# Patient Record
Sex: FEMALE | Race: WHITE | Employment: OTHER | URBAN - METROPOLITAN AREA
[De-identification: names, ages, dates, MRNs, and addresses within clinical notes are randomized per-mention and may not be internally consistent; named-entity substitution may affect disease eponyms.]

---

## 2017-01-16 RX ORDER — ZOLPIDEM TARTRATE 10 MG/1
TABLET ORAL
Qty: 90 TABLET | Refills: 0 | Status: SHIPPED | OUTPATIENT
Start: 2017-01-16 | End: 2017-04-24 | Stop reason: SDUPTHER

## 2017-02-09 ENCOUNTER — OFFICE VISIT (OUTPATIENT)
Dept: INTERNAL MEDICINE CLINIC | Age: 72
End: 2017-02-09

## 2017-02-09 VITALS
SYSTOLIC BLOOD PRESSURE: 130 MMHG | WEIGHT: 130.4 LBS | DIASTOLIC BLOOD PRESSURE: 100 MMHG | HEIGHT: 64 IN | BODY MASS INDEX: 22.26 KG/M2 | HEART RATE: 56 BPM | RESPIRATION RATE: 16 BRPM

## 2017-02-09 DIAGNOSIS — I10 ESSENTIAL HYPERTENSION: Primary | ICD-10-CM

## 2017-02-09 PROCEDURE — 4040F PNEUMOC VAC/ADMIN/RCVD: CPT | Performed by: INTERNAL MEDICINE

## 2017-02-09 PROCEDURE — 1123F ACP DISCUSS/DSCN MKR DOCD: CPT | Performed by: INTERNAL MEDICINE

## 2017-02-09 PROCEDURE — G8399 PT W/DXA RESULTS DOCUMENT: HCPCS | Performed by: INTERNAL MEDICINE

## 2017-02-09 PROCEDURE — 1090F PRES/ABSN URINE INCON ASSESS: CPT | Performed by: INTERNAL MEDICINE

## 2017-02-09 PROCEDURE — 1036F TOBACCO NON-USER: CPT | Performed by: INTERNAL MEDICINE

## 2017-02-09 PROCEDURE — 3017F COLORECTAL CA SCREEN DOC REV: CPT | Performed by: INTERNAL MEDICINE

## 2017-02-09 PROCEDURE — G8419 CALC BMI OUT NRM PARAM NOF/U: HCPCS | Performed by: INTERNAL MEDICINE

## 2017-02-09 PROCEDURE — G8484 FLU IMMUNIZE NO ADMIN: HCPCS | Performed by: INTERNAL MEDICINE

## 2017-02-09 PROCEDURE — 3014F SCREEN MAMMO DOC REV: CPT | Performed by: INTERNAL MEDICINE

## 2017-02-09 PROCEDURE — 99213 OFFICE O/P EST LOW 20 MIN: CPT | Performed by: INTERNAL MEDICINE

## 2017-02-09 PROCEDURE — G8427 DOCREV CUR MEDS BY ELIG CLIN: HCPCS | Performed by: INTERNAL MEDICINE

## 2017-02-09 RX ORDER — TRIAMTERENE AND HYDROCHLOROTHIAZIDE 37.5; 25 MG/1; MG/1
1 TABLET ORAL DAILY
Qty: 30 TABLET | Refills: 3 | Status: SHIPPED | OUTPATIENT
Start: 2017-02-09 | End: 2017-03-09 | Stop reason: SDUPTHER

## 2017-03-09 ENCOUNTER — OFFICE VISIT (OUTPATIENT)
Dept: INTERNAL MEDICINE CLINIC | Age: 72
End: 2017-03-09

## 2017-03-09 VITALS
WEIGHT: 128.8 LBS | SYSTOLIC BLOOD PRESSURE: 120 MMHG | DIASTOLIC BLOOD PRESSURE: 60 MMHG | HEIGHT: 64 IN | BODY MASS INDEX: 21.99 KG/M2 | HEART RATE: 64 BPM | RESPIRATION RATE: 16 BRPM

## 2017-03-09 DIAGNOSIS — I10 ESSENTIAL HYPERTENSION: Primary | ICD-10-CM

## 2017-03-09 LAB
ANION GAP SERPL CALCULATED.3IONS-SCNC: 18 MMOL/L (ref 3–16)
BUN BLDV-MCNC: 14 MG/DL (ref 7–20)
CALCIUM SERPL-MCNC: 9.8 MG/DL (ref 8.3–10.6)
CHLORIDE BLD-SCNC: 99 MMOL/L (ref 99–110)
CO2: 24 MMOL/L (ref 21–32)
CREAT SERPL-MCNC: 0.7 MG/DL (ref 0.6–1.2)
GFR AFRICAN AMERICAN: >60
GFR NON-AFRICAN AMERICAN: >60
GLUCOSE BLD-MCNC: 88 MG/DL (ref 70–99)
POTASSIUM SERPL-SCNC: 4.1 MMOL/L (ref 3.5–5.1)
SODIUM BLD-SCNC: 141 MMOL/L (ref 136–145)

## 2017-03-09 PROCEDURE — 3017F COLORECTAL CA SCREEN DOC REV: CPT | Performed by: INTERNAL MEDICINE

## 2017-03-09 PROCEDURE — G8427 DOCREV CUR MEDS BY ELIG CLIN: HCPCS | Performed by: INTERNAL MEDICINE

## 2017-03-09 PROCEDURE — 3014F SCREEN MAMMO DOC REV: CPT | Performed by: INTERNAL MEDICINE

## 2017-03-09 PROCEDURE — 4040F PNEUMOC VAC/ADMIN/RCVD: CPT | Performed by: INTERNAL MEDICINE

## 2017-03-09 PROCEDURE — 1123F ACP DISCUSS/DSCN MKR DOCD: CPT | Performed by: INTERNAL MEDICINE

## 2017-03-09 PROCEDURE — G8419 CALC BMI OUT NRM PARAM NOF/U: HCPCS | Performed by: INTERNAL MEDICINE

## 2017-03-09 PROCEDURE — G8484 FLU IMMUNIZE NO ADMIN: HCPCS | Performed by: INTERNAL MEDICINE

## 2017-03-09 PROCEDURE — G8399 PT W/DXA RESULTS DOCUMENT: HCPCS | Performed by: INTERNAL MEDICINE

## 2017-03-09 PROCEDURE — 1036F TOBACCO NON-USER: CPT | Performed by: INTERNAL MEDICINE

## 2017-03-09 PROCEDURE — 1090F PRES/ABSN URINE INCON ASSESS: CPT | Performed by: INTERNAL MEDICINE

## 2017-03-09 PROCEDURE — 99213 OFFICE O/P EST LOW 20 MIN: CPT | Performed by: INTERNAL MEDICINE

## 2017-03-09 RX ORDER — TRIAMTERENE AND HYDROCHLOROTHIAZIDE 37.5; 25 MG/1; MG/1
1 TABLET ORAL DAILY
Qty: 90 TABLET | Refills: 3 | Status: SHIPPED | OUTPATIENT
Start: 2017-03-09 | End: 2018-02-15 | Stop reason: SDUPTHER

## 2017-04-24 RX ORDER — ZOLPIDEM TARTRATE 10 MG/1
TABLET ORAL
Qty: 90 TABLET | Refills: 0 | Status: SHIPPED | OUTPATIENT
Start: 2017-04-24 | End: 2017-08-14 | Stop reason: SDUPTHER

## 2017-05-05 ENCOUNTER — OFFICE VISIT (OUTPATIENT)
Dept: INTERNAL MEDICINE CLINIC | Age: 72
End: 2017-05-05

## 2017-05-05 VITALS
HEIGHT: 64 IN | TEMPERATURE: 97.9 F | BODY MASS INDEX: 22.09 KG/M2 | HEART RATE: 75 BPM | OXYGEN SATURATION: 97 % | DIASTOLIC BLOOD PRESSURE: 80 MMHG | WEIGHT: 129.4 LBS | SYSTOLIC BLOOD PRESSURE: 118 MMHG

## 2017-05-05 DIAGNOSIS — J40 BRONCHITIS: Primary | ICD-10-CM

## 2017-05-05 DIAGNOSIS — I10 ESSENTIAL HYPERTENSION: ICD-10-CM

## 2017-05-05 PROCEDURE — 1036F TOBACCO NON-USER: CPT | Performed by: INTERNAL MEDICINE

## 2017-05-05 PROCEDURE — G8427 DOCREV CUR MEDS BY ELIG CLIN: HCPCS | Performed by: INTERNAL MEDICINE

## 2017-05-05 PROCEDURE — 3014F SCREEN MAMMO DOC REV: CPT | Performed by: INTERNAL MEDICINE

## 2017-05-05 PROCEDURE — G8399 PT W/DXA RESULTS DOCUMENT: HCPCS | Performed by: INTERNAL MEDICINE

## 2017-05-05 PROCEDURE — 1090F PRES/ABSN URINE INCON ASSESS: CPT | Performed by: INTERNAL MEDICINE

## 2017-05-05 PROCEDURE — 99213 OFFICE O/P EST LOW 20 MIN: CPT | Performed by: INTERNAL MEDICINE

## 2017-05-05 PROCEDURE — 4040F PNEUMOC VAC/ADMIN/RCVD: CPT | Performed by: INTERNAL MEDICINE

## 2017-05-05 PROCEDURE — 1123F ACP DISCUSS/DSCN MKR DOCD: CPT | Performed by: INTERNAL MEDICINE

## 2017-05-05 PROCEDURE — 3017F COLORECTAL CA SCREEN DOC REV: CPT | Performed by: INTERNAL MEDICINE

## 2017-05-05 PROCEDURE — G8419 CALC BMI OUT NRM PARAM NOF/U: HCPCS | Performed by: INTERNAL MEDICINE

## 2017-05-05 RX ORDER — CEFUROXIME AXETIL 250 MG/1
250 TABLET ORAL 2 TIMES DAILY
Qty: 20 TABLET | Refills: 0 | Status: SHIPPED | OUTPATIENT
Start: 2017-05-05 | End: 2017-05-15

## 2017-05-05 ASSESSMENT — ENCOUNTER SYMPTOMS
EYES NEGATIVE: 1
CHEST TIGHTNESS: 0
SHORTNESS OF BREATH: 0
CHOKING: 0
COUGH: 1

## 2017-05-19 ENCOUNTER — TELEPHONE (OUTPATIENT)
Dept: INTERNAL MEDICINE CLINIC | Age: 72
End: 2017-05-19

## 2017-05-19 RX ORDER — AZITHROMYCIN 250 MG/1
TABLET, FILM COATED ORAL
Qty: 1 PACKET | Refills: 0 | Status: SHIPPED | OUTPATIENT
Start: 2017-05-19 | End: 2017-05-29

## 2017-08-14 RX ORDER — ZOLPIDEM TARTRATE 10 MG/1
TABLET ORAL
Qty: 90 TABLET | Refills: 0 | Status: SHIPPED | OUTPATIENT
Start: 2017-08-14 | End: 2017-12-28 | Stop reason: SDUPTHER

## 2017-09-13 ENCOUNTER — TELEPHONE (OUTPATIENT)
Dept: INTERNAL MEDICINE CLINIC | Age: 72
End: 2017-09-13

## 2017-09-13 DIAGNOSIS — E78.00 PURE HYPERCHOLESTEROLEMIA: Primary | ICD-10-CM

## 2017-09-13 DIAGNOSIS — I10 ESSENTIAL HYPERTENSION: ICD-10-CM

## 2017-09-18 LAB
A/G RATIO: 2 (ref 1.1–2.2)
ALBUMIN SERPL-MCNC: 4.2 G/DL (ref 3.4–5)
ALP BLD-CCNC: 58 U/L (ref 40–129)
ALT SERPL-CCNC: 26 U/L (ref 10–40)
ANION GAP SERPL CALCULATED.3IONS-SCNC: 13 MMOL/L (ref 3–16)
AST SERPL-CCNC: 31 U/L (ref 15–37)
BILIRUB SERPL-MCNC: <0.2 MG/DL (ref 0–1)
BUN BLDV-MCNC: 23 MG/DL (ref 7–20)
CALCIUM SERPL-MCNC: 9.8 MG/DL (ref 8.3–10.6)
CHLORIDE BLD-SCNC: 103 MMOL/L (ref 99–110)
CHOLESTEROL, TOTAL: 183 MG/DL (ref 0–199)
CO2: 27 MMOL/L (ref 21–32)
CREAT SERPL-MCNC: 0.8 MG/DL (ref 0.6–1.2)
GFR AFRICAN AMERICAN: >60
GFR NON-AFRICAN AMERICAN: >60
GLOBULIN: 2.1 G/DL
GLUCOSE BLD-MCNC: 82 MG/DL (ref 70–99)
HDLC SERPL-MCNC: 62 MG/DL (ref 40–60)
LDL CHOLESTEROL CALCULATED: 110 MG/DL
POTASSIUM SERPL-SCNC: 4.5 MMOL/L (ref 3.5–5.1)
SODIUM BLD-SCNC: 143 MMOL/L (ref 136–145)
TOTAL PROTEIN: 6.3 G/DL (ref 6.4–8.2)
TRIGL SERPL-MCNC: 53 MG/DL (ref 0–150)
TSH SERPL DL<=0.05 MIU/L-ACNC: 3.27 UIU/ML (ref 0.27–4.2)
VLDLC SERPL CALC-MCNC: 11 MG/DL

## 2017-09-25 ENCOUNTER — OFFICE VISIT (OUTPATIENT)
Dept: INTERNAL MEDICINE CLINIC | Age: 72
End: 2017-09-25

## 2017-09-25 VITALS
RESPIRATION RATE: 16 BRPM | SYSTOLIC BLOOD PRESSURE: 104 MMHG | WEIGHT: 128.2 LBS | HEIGHT: 64 IN | DIASTOLIC BLOOD PRESSURE: 64 MMHG | BODY MASS INDEX: 21.89 KG/M2 | HEART RATE: 78 BPM

## 2017-09-25 DIAGNOSIS — F51.01 PRIMARY INSOMNIA: ICD-10-CM

## 2017-09-25 DIAGNOSIS — I10 ESSENTIAL HYPERTENSION: Primary | ICD-10-CM

## 2017-09-25 DIAGNOSIS — E78.00 PURE HYPERCHOLESTEROLEMIA: ICD-10-CM

## 2017-09-25 DIAGNOSIS — Z23 NEED FOR INFLUENZA VACCINATION: ICD-10-CM

## 2017-09-25 PROCEDURE — 3014F SCREEN MAMMO DOC REV: CPT | Performed by: INTERNAL MEDICINE

## 2017-09-25 PROCEDURE — G8399 PT W/DXA RESULTS DOCUMENT: HCPCS | Performed by: INTERNAL MEDICINE

## 2017-09-25 PROCEDURE — G8427 DOCREV CUR MEDS BY ELIG CLIN: HCPCS | Performed by: INTERNAL MEDICINE

## 2017-09-25 PROCEDURE — 4040F PNEUMOC VAC/ADMIN/RCVD: CPT | Performed by: INTERNAL MEDICINE

## 2017-09-25 PROCEDURE — 1036F TOBACCO NON-USER: CPT | Performed by: INTERNAL MEDICINE

## 2017-09-25 PROCEDURE — 90662 IIV NO PRSV INCREASED AG IM: CPT | Performed by: INTERNAL MEDICINE

## 2017-09-25 PROCEDURE — 99214 OFFICE O/P EST MOD 30 MIN: CPT | Performed by: INTERNAL MEDICINE

## 2017-09-25 PROCEDURE — 1090F PRES/ABSN URINE INCON ASSESS: CPT | Performed by: INTERNAL MEDICINE

## 2017-09-25 PROCEDURE — 3017F COLORECTAL CA SCREEN DOC REV: CPT | Performed by: INTERNAL MEDICINE

## 2017-09-25 PROCEDURE — 1123F ACP DISCUSS/DSCN MKR DOCD: CPT | Performed by: INTERNAL MEDICINE

## 2017-09-25 PROCEDURE — G8420 CALC BMI NORM PARAMETERS: HCPCS | Performed by: INTERNAL MEDICINE

## 2017-09-25 PROCEDURE — G0008 ADMIN INFLUENZA VIRUS VAC: HCPCS | Performed by: INTERNAL MEDICINE

## 2017-09-25 ASSESSMENT — PATIENT HEALTH QUESTIONNAIRE - PHQ9
SUM OF ALL RESPONSES TO PHQ9 QUESTIONS 1 & 2: 0
SUM OF ALL RESPONSES TO PHQ QUESTIONS 1-9: 0
2. FEELING DOWN, DEPRESSED OR HOPELESS: 0
1. LITTLE INTEREST OR PLEASURE IN DOING THINGS: 0

## 2017-11-13 RX ORDER — NORTRIPTYLINE HYDROCHLORIDE 10 MG/1
CAPSULE ORAL
Qty: 270 CAPSULE | Refills: 3 | Status: SHIPPED | OUTPATIENT
Start: 2017-11-13 | End: 2018-03-26 | Stop reason: SDUPTHER

## 2017-11-13 NOTE — TELEPHONE ENCOUNTER
From: Crystal Hogan  Sent: 11/12/2017 10:56 PM EST  Subject: Medication Renewal Request    Crystal Hogan would like a refill of the following medications:  nortriptyline (PAMELOR) 10 MG capsule [GUDREEP Macdonald MD]    [facility], [address] [place] [address] RD - P [phone] - F [phone]    Comment:  My refills of Nortriptyline will run out in a few days. Previous directions said to take 1-3 pills. However, Dr. Cade Him suggesting taking 4 pills to help with drowsiness at night to avoid going higher on my Ambien. It's been working well with this regimen.

## 2017-12-29 RX ORDER — ZOLPIDEM TARTRATE 10 MG/1
10 TABLET ORAL NIGHTLY PRN
Qty: 90 TABLET | Refills: 0 | Status: SHIPPED | OUTPATIENT
Start: 2017-12-29 | End: 2018-05-15 | Stop reason: SDUPTHER

## 2018-02-12 ENCOUNTER — OFFICE VISIT (OUTPATIENT)
Dept: INTERNAL MEDICINE CLINIC | Age: 73
End: 2018-02-12

## 2018-02-12 VITALS
HEART RATE: 82 BPM | SYSTOLIC BLOOD PRESSURE: 110 MMHG | HEIGHT: 64 IN | RESPIRATION RATE: 16 BRPM | BODY MASS INDEX: 22.67 KG/M2 | DIASTOLIC BLOOD PRESSURE: 60 MMHG | WEIGHT: 132.8 LBS

## 2018-02-12 DIAGNOSIS — I73.00 RAYNAUD'S DISEASE WITHOUT GANGRENE: Primary | ICD-10-CM

## 2018-02-12 PROCEDURE — 1090F PRES/ABSN URINE INCON ASSESS: CPT | Performed by: INTERNAL MEDICINE

## 2018-02-12 PROCEDURE — 4040F PNEUMOC VAC/ADMIN/RCVD: CPT | Performed by: INTERNAL MEDICINE

## 2018-02-12 PROCEDURE — 1036F TOBACCO NON-USER: CPT | Performed by: INTERNAL MEDICINE

## 2018-02-12 PROCEDURE — G8420 CALC BMI NORM PARAMETERS: HCPCS | Performed by: INTERNAL MEDICINE

## 2018-02-12 PROCEDURE — 3014F SCREEN MAMMO DOC REV: CPT | Performed by: INTERNAL MEDICINE

## 2018-02-12 PROCEDURE — 1123F ACP DISCUSS/DSCN MKR DOCD: CPT | Performed by: INTERNAL MEDICINE

## 2018-02-12 PROCEDURE — G8484 FLU IMMUNIZE NO ADMIN: HCPCS | Performed by: INTERNAL MEDICINE

## 2018-02-12 PROCEDURE — G8399 PT W/DXA RESULTS DOCUMENT: HCPCS | Performed by: INTERNAL MEDICINE

## 2018-02-12 PROCEDURE — G8427 DOCREV CUR MEDS BY ELIG CLIN: HCPCS | Performed by: INTERNAL MEDICINE

## 2018-02-12 PROCEDURE — 99212 OFFICE O/P EST SF 10 MIN: CPT | Performed by: INTERNAL MEDICINE

## 2018-02-12 PROCEDURE — 3017F COLORECTAL CA SCREEN DOC REV: CPT | Performed by: INTERNAL MEDICINE

## 2018-02-12 NOTE — PROGRESS NOTES
Subjective:      Patient ID: Rochelle Briggs is a 81 y.o. female. HPI  Here for evaluation of bilateral hands with white fingertips that have been occurring in the last 6 weeks. No other joint problems or swallowing problems. Has had some numbness of her fingertips at times as well Which is intermittent and is not present every day. The raynaud's is familial in her family. No family history of autoimmune disease. Review of Systems    Objective:   Physical Exam  Both hands are normal. There is some nonblanching purplish discoloration of the left thumb on the palmar side. Bilateral hands with normal radial pulses and good capillary refill. There are no ulcerations of the phalanges. Assessment:      Raynaud's phenomenon-  No other history or sign of autoimmune disease. Doubt this is of any concern. Discussed keeping fingers warm and cold weather. She will call if the symptoms progress. Doubt the hands numbness represents any neuropathy.       Plan:      Follow-up when necessary

## 2018-02-15 ENCOUNTER — TELEPHONE (OUTPATIENT)
Dept: INTERNAL MEDICINE CLINIC | Age: 73
End: 2018-02-15

## 2018-02-16 ENCOUNTER — TELEPHONE (OUTPATIENT)
Dept: INTERNAL MEDICINE CLINIC | Age: 73
End: 2018-02-16

## 2018-02-16 DIAGNOSIS — J40 BRONCHITIS: Primary | ICD-10-CM

## 2018-02-16 RX ORDER — GUAIFENESIN AND CODEINE PHOSPHATE 100; 10 MG/5ML; MG/5ML
5-10 SOLUTION ORAL EVERY 4 HOURS PRN
Qty: 180 ML | Refills: 1 | Status: SHIPPED | OUTPATIENT
Start: 2018-02-16 | End: 2018-03-26

## 2018-02-16 RX ORDER — AZITHROMYCIN 250 MG/1
TABLET, FILM COATED ORAL
Qty: 1 PACKET | Refills: 0 | Status: SHIPPED | OUTPATIENT
Start: 2018-02-16 | End: 2018-02-26

## 2018-02-16 NOTE — TELEPHONE ENCOUNTER
Patient was instructed to call today if symptoms were not improved with Mucinex DM. Patient is reporting that she's exp lots more coughing and mucous with one dose of Mucinex DM. Patient was unable to sleep last evening due to coughing. Please advise.

## 2018-03-26 ENCOUNTER — OFFICE VISIT (OUTPATIENT)
Dept: INTERNAL MEDICINE CLINIC | Age: 73
End: 2018-03-26

## 2018-03-26 VITALS
SYSTOLIC BLOOD PRESSURE: 116 MMHG | HEIGHT: 64 IN | RESPIRATION RATE: 16 BRPM | HEART RATE: 72 BPM | WEIGHT: 129.6 LBS | DIASTOLIC BLOOD PRESSURE: 72 MMHG | BODY MASS INDEX: 22.13 KG/M2

## 2018-03-26 DIAGNOSIS — F51.01 PRIMARY INSOMNIA: ICD-10-CM

## 2018-03-26 DIAGNOSIS — I10 ESSENTIAL HYPERTENSION: Primary | ICD-10-CM

## 2018-03-26 DIAGNOSIS — E78.00 PURE HYPERCHOLESTEROLEMIA: ICD-10-CM

## 2018-03-26 LAB
A/G RATIO: 2 (ref 1.1–2.2)
ALBUMIN SERPL-MCNC: 4.5 G/DL (ref 3.4–5)
ALP BLD-CCNC: 59 U/L (ref 40–129)
ALT SERPL-CCNC: 16 U/L (ref 10–40)
ANION GAP SERPL CALCULATED.3IONS-SCNC: 14 MMOL/L (ref 3–16)
AST SERPL-CCNC: 19 U/L (ref 15–37)
BILIRUB SERPL-MCNC: 0.5 MG/DL (ref 0–1)
BUN BLDV-MCNC: 20 MG/DL (ref 7–20)
CALCIUM SERPL-MCNC: 9.5 MG/DL (ref 8.3–10.6)
CHLORIDE BLD-SCNC: 101 MMOL/L (ref 99–110)
CHOLESTEROL, TOTAL: 189 MG/DL (ref 0–199)
CO2: 25 MMOL/L (ref 21–32)
CREAT SERPL-MCNC: 0.7 MG/DL (ref 0.6–1.2)
GFR AFRICAN AMERICAN: >60
GFR NON-AFRICAN AMERICAN: >60
GLOBULIN: 2.2 G/DL
GLUCOSE BLD-MCNC: 99 MG/DL (ref 70–99)
HDLC SERPL-MCNC: 74 MG/DL (ref 40–60)
LDL CHOLESTEROL CALCULATED: 99 MG/DL
POTASSIUM SERPL-SCNC: 4.1 MMOL/L (ref 3.5–5.1)
SODIUM BLD-SCNC: 140 MMOL/L (ref 136–145)
TOTAL PROTEIN: 6.7 G/DL (ref 6.4–8.2)
TRIGL SERPL-MCNC: 80 MG/DL (ref 0–150)
VLDLC SERPL CALC-MCNC: 16 MG/DL

## 2018-03-26 PROCEDURE — 1036F TOBACCO NON-USER: CPT | Performed by: INTERNAL MEDICINE

## 2018-03-26 PROCEDURE — 3014F SCREEN MAMMO DOC REV: CPT | Performed by: INTERNAL MEDICINE

## 2018-03-26 PROCEDURE — G8420 CALC BMI NORM PARAMETERS: HCPCS | Performed by: INTERNAL MEDICINE

## 2018-03-26 PROCEDURE — 1123F ACP DISCUSS/DSCN MKR DOCD: CPT | Performed by: INTERNAL MEDICINE

## 2018-03-26 PROCEDURE — G8427 DOCREV CUR MEDS BY ELIG CLIN: HCPCS | Performed by: INTERNAL MEDICINE

## 2018-03-26 PROCEDURE — 4040F PNEUMOC VAC/ADMIN/RCVD: CPT | Performed by: INTERNAL MEDICINE

## 2018-03-26 PROCEDURE — 99214 OFFICE O/P EST MOD 30 MIN: CPT | Performed by: INTERNAL MEDICINE

## 2018-03-26 PROCEDURE — 1090F PRES/ABSN URINE INCON ASSESS: CPT | Performed by: INTERNAL MEDICINE

## 2018-03-26 PROCEDURE — G8482 FLU IMMUNIZE ORDER/ADMIN: HCPCS | Performed by: INTERNAL MEDICINE

## 2018-03-26 PROCEDURE — G8399 PT W/DXA RESULTS DOCUMENT: HCPCS | Performed by: INTERNAL MEDICINE

## 2018-03-26 PROCEDURE — 3017F COLORECTAL CA SCREEN DOC REV: CPT | Performed by: INTERNAL MEDICINE

## 2018-03-26 RX ORDER — NORTRIPTYLINE HYDROCHLORIDE 10 MG/1
CAPSULE ORAL
Qty: 450 CAPSULE | Refills: 3 | Status: SHIPPED | OUTPATIENT
Start: 2018-03-26 | End: 2018-05-14

## 2018-03-26 NOTE — PROGRESS NOTES
Subjective:      Patient ID: Ike Baird is a 81 y.o. female. HPI   Here for f/u of her htn and hyperlipidemia and her insomia. She is bothered with a dry mouth,is not sure what is causing this trouble. 1. Essential hypertension  -Denies chest pain or shortness of breath. Denies PND or orthopnea. No lower extremity edema noted. 2. Pure hypercholesterolemia  -Tolerating medications well. Denies myalgias or muscle weakness. 3. Primary insomnia  -still not sleeping well and has dry mouth with the nortriptyline      Review of Systems    Current Outpatient Prescriptions on File Prior to Visit   Medication Sig Dispense Refill    triamterene-hydrochlorothiazide (MAXZIDE-25) 37.5-25 MG per tablet TAKE 1 TABLET BY MOUTH DAILY 90 tablet 1    zolpidem (AMBIEN) 10 MG tablet Take 1 tablet by mouth nightly as needed for Sleep for up to 90 days. 90 tablet 0    nortriptyline (PAMELOR) 10 MG capsule TAKE ONE TO THREE CAPSULES BY MOUTH EVERY NIGHT AT BEDTIME AS DIRECTED 270 capsule 3    simvastatin (ZOCOR) 40 MG tablet TAKE 1 TABLET BY MOUTH EVERY EVENING 90 tablet 3    Cyanocobalamin (B-12) 100 MCG TABS Take by mouth      omeprazole (PRILOSEC OTC) 20 MG tablet Take 20 mg by mouth daily      Multiple Vitamins-Minerals (MULTIPLE VITAMINS/WOMENS PO) Take by mouth      Fish Oil-Cholecalciferol (FISH OIL + D3 PO) Take by mouth      Cholecalciferol (VITAMIN D3) 2000 UNITS CAPS Take by mouth      ESTRACE VAGINAL 0.1 MG/GM vaginal cream Vaginal cream twice weekly      aspirin 81 MG tablet Take 81 mg by mouth daily. No current facility-administered medications on file prior to visit. Objective:   Physical Exam   Constitutional: She appears well-developed and well-nourished. Cardiovascular: Normal rate, regular rhythm and normal heart sounds. Pulmonary/Chest: Effort normal and breath sounds normal. She has no wheezes. She has no rales. Musculoskeletal: She exhibits no edema.    Vitals

## 2018-03-28 DIAGNOSIS — E78.00 PURE HYPERCHOLESTEROLEMIA: Primary | ICD-10-CM

## 2018-03-28 RX ORDER — ATORVASTATIN CALCIUM 40 MG/1
40 TABLET, FILM COATED ORAL DAILY
Qty: 90 TABLET | Refills: 1 | Status: SHIPPED | OUTPATIENT
Start: 2018-03-28 | End: 2018-09-16 | Stop reason: SDUPTHER

## 2018-05-15 RX ORDER — ZOLPIDEM TARTRATE 10 MG/1
TABLET ORAL
Qty: 90 TABLET | Refills: 0 | Status: SHIPPED | OUTPATIENT
Start: 2018-05-15 | End: 2018-11-04 | Stop reason: SDUPTHER

## 2018-07-09 ENCOUNTER — OFFICE VISIT (OUTPATIENT)
Dept: SLEEP MEDICINE | Age: 73
End: 2018-07-09

## 2018-07-09 VITALS
HEART RATE: 59 BPM | WEIGHT: 132.6 LBS | HEIGHT: 64 IN | RESPIRATION RATE: 18 BRPM | SYSTOLIC BLOOD PRESSURE: 84 MMHG | DIASTOLIC BLOOD PRESSURE: 60 MMHG | BODY MASS INDEX: 22.64 KG/M2 | TEMPERATURE: 98.1 F | OXYGEN SATURATION: 98 %

## 2018-07-09 DIAGNOSIS — G25.81 RESTLESS LEG SYNDROME: ICD-10-CM

## 2018-07-09 DIAGNOSIS — F51.04 CHRONIC INSOMNIA: ICD-10-CM

## 2018-07-09 DIAGNOSIS — G25.81 RESTLESS LEG SYNDROME: Primary | ICD-10-CM

## 2018-07-09 DIAGNOSIS — E61.1 LOW IRON: ICD-10-CM

## 2018-07-09 LAB — FERRITIN: 62.2 NG/ML (ref 15–150)

## 2018-07-09 PROCEDURE — G8420 CALC BMI NORM PARAMETERS: HCPCS | Performed by: PSYCHIATRY & NEUROLOGY

## 2018-07-09 PROCEDURE — 4040F PNEUMOC VAC/ADMIN/RCVD: CPT | Performed by: PSYCHIATRY & NEUROLOGY

## 2018-07-09 PROCEDURE — G8399 PT W/DXA RESULTS DOCUMENT: HCPCS | Performed by: PSYCHIATRY & NEUROLOGY

## 2018-07-09 PROCEDURE — 99204 OFFICE O/P NEW MOD 45 MIN: CPT | Performed by: PSYCHIATRY & NEUROLOGY

## 2018-07-09 PROCEDURE — G8427 DOCREV CUR MEDS BY ELIG CLIN: HCPCS | Performed by: PSYCHIATRY & NEUROLOGY

## 2018-07-09 PROCEDURE — 1090F PRES/ABSN URINE INCON ASSESS: CPT | Performed by: PSYCHIATRY & NEUROLOGY

## 2018-07-09 PROCEDURE — 3017F COLORECTAL CA SCREEN DOC REV: CPT | Performed by: PSYCHIATRY & NEUROLOGY

## 2018-07-09 PROCEDURE — 1123F ACP DISCUSS/DSCN MKR DOCD: CPT | Performed by: PSYCHIATRY & NEUROLOGY

## 2018-07-09 PROCEDURE — 1036F TOBACCO NON-USER: CPT | Performed by: PSYCHIATRY & NEUROLOGY

## 2018-07-09 RX ORDER — ROPINIROLE 0.5 MG/1
TABLET, FILM COATED ORAL
Qty: 60 TABLET | Refills: 5 | Status: SHIPPED | OUTPATIENT
Start: 2018-07-09 | End: 2018-07-09 | Stop reason: SDUPTHER

## 2018-07-09 RX ORDER — ROPINIROLE 0.5 MG/1
TABLET, FILM COATED ORAL
Qty: 180 TABLET | Refills: 5 | Status: SHIPPED | OUTPATIENT
Start: 2018-07-09 | End: 2018-10-17

## 2018-07-09 ASSESSMENT — SLEEP AND FATIGUE QUESTIONNAIRES
NECK CIRCUMFERENCE (INCHES): 13.75
HOW LIKELY ARE YOU TO NOD OFF OR FALL ASLEEP WHILE SITTING AND TALKING TO SOMEONE: 0
HOW LIKELY ARE YOU TO NOD OFF OR FALL ASLEEP WHILE SITTING AND READING: 1
ESS TOTAL SCORE: 3
HOW LIKELY ARE YOU TO NOD OFF OR FALL ASLEEP WHILE LYING DOWN TO REST IN THE AFTERNOON WHEN CIRCUMSTANCES PERMIT: 1
HOW LIKELY ARE YOU TO NOD OFF OR FALL ASLEEP WHEN YOU ARE A PASSENGER IN A CAR FOR AN HOUR WITHOUT A BREAK: 1
HOW LIKELY ARE YOU TO NOD OFF OR FALL ASLEEP WHILE SITTING QUIETLY AFTER LUNCH WITHOUT ALCOHOL: 0
HOW LIKELY ARE YOU TO NOD OFF OR FALL ASLEEP WHILE SITTING INACTIVE IN A PUBLIC PLACE: 0
HOW LIKELY ARE YOU TO NOD OFF OR FALL ASLEEP WHILE WATCHING TV: 0
HOW LIKELY ARE YOU TO NOD OFF OR FALL ASLEEP IN A CAR, WHILE STOPPED FOR A FEW MINUTES IN TRAFFIC: 0

## 2018-07-09 ASSESSMENT — ENCOUNTER SYMPTOMS
EYES NEGATIVE: 1
ALLERGIC/IMMUNOLOGIC NEGATIVE: 1
RESPIRATORY NEGATIVE: 1
CHOKING: 0
APNEA: 0

## 2018-07-09 NOTE — PROGRESS NOTES
MD ELISHA Simental Board Certified in Sleep Medicine  Certified in [address] [place] [address] Certified in Neurology [address]  [address]  [facility] [address],  [place] [address]  326 [facility] ([address] Sleep   [address] [facility], [address] [place] [address] SLEEP MEDICINE [place]    Subjective:     Patient ID: Lennox Prose is a 72 y.o. female. Chief Complaint   Patient presents with    Other     ref Dr Rico Wang, insomnia, no studie       HPI:        Lennox Prose is a 72 y.o. female referred by Dr Razia Evans for a sleep evaluation. She complains of sleep initiation and maintening insomnia but she denies snoring, snorting, choking, periods of not breathing, knees buckling with laughing, completely or partially paralyzed while falling asleep or waking up, sinus problems, congested nose, feels sleepy during the day, take naps during the day, noisy environment, uncomfortable room temperature, uncomfortable bedding. Symptoms began 40 years ago, gradually worsening since that time. SLEEP SCHEDULE: Goes to bed around 11 PM in the weekdays and 11 PM in the weekends. It usually takes the patient 30 minutes with Ambien 5 mg to fall asleep. The patient gets up 2-3 per night to go to the bathroom. The Patient finally gets up at 8 AM during the weekdays and 8 AM in the weekends. patient wakes up with dry mouth. The patient has restless sleep with frequent arousals. The Patient scored Total score: 3 on Melba Sleepiness Scale ( more than 10 is indicative of daytime sleepiness) and 15 in fatigue scale ( more than 36 is indicative of daytime fatigue). The patient takes no naps. On Ambien for several year, recently added very small dose of Unisom and helped a lot. Previous evaluation and treatment has included- none.     Insomnia with sleep initiation and maintaining, usually takes the patient 2 hours to fall in sleep, wakes up 2-3 times from few minutes each time to fall back in sleep, usually stays in bed trying to fall in sleep, this might happened 7 days/week. Restless leg syndrome symptoms; patient has overwhelming urge to move the legs itchy feet or jumpy legs, has to rub the bottoms of the feet or take a shower, usually associated with unpleasant sensations. The urge to move the legs is worse at rest and at night and relieved by movement. Patient has 2-3 episodes a week, sometime the patient has trouble falling asleep due to this feeling, mostly at the bed time associated with sleep disturbance and with involuntary, jerking movements of the legs during sleep. The patient notes were attached to hr record. DOT/CDL - No  FAA/'s license - No      Previous Report(s) Reviewed: historical medical records         Social History     Social History    Marital status:      Spouse name: N/A    Number of children: N/A    Years of education: N/A     Occupational History    Not on file. Social History Main Topics    Smoking status: Never Smoker    Smokeless tobacco: Never Used    Alcohol use No    Drug use: No    Sexual activity: Not on file     Other Topics Concern    Not on file     Social History Narrative    No narrative on file       Prior to Admission medications    Medication Sig Start Date End Date Taking?  Authorizing Provider   Doxylamine Succinate, Sleep, (UNISOM PO) Take by mouth   Yes Historical Provider, MD   rOPINIRole (REQUIP) 0.5 MG tablet 1-2 tabs 2 hours before the bedtime 7/9/18  Yes Paco Nguyen MD   zolpidem (AMBIEN) 10 MG tablet TAKE 1 TABLET BY MOUTH EVERY NIGHT AS NEEDED FOR SLEEP 5/15/18 8/13/18 Yes Neno Narvaez MD   atorvastatin (LIPITOR) 40 MG tablet Take 1 tablet by mouth daily 3/28/18  Yes Neno Narvaez MD   triamterene-hydrochlorothiazide [facility]) 37.5-25 MG per tablet TAKE 1 TABLET BY MOUTH DAILY 2/15/18  Yes Neno Narvaez MD for arthralgias and myalgias. Allergic/Immunologic: Negative. Neurological: Negative. Hematological: Bruises/bleeds easily. Psychiatric/Behavioral: Negative. Objective:     Vitals:  Weight BMI Neck circumference    Wt Readings from Last 3 Encounters:   07/09/18 132 lb 9.6 oz (60.1 kg)   03/26/18 129 lb 9.6 oz (58.8 kg)   02/12/18 132 lb 12.8 oz (60.2 kg)    Body mass index is 22.76 kg/m². Neck circumference: 13.75     BP HR SaO2   BP Readings from Last 3 Encounters:   07/09/18 84/60   03/26/18 116/72   02/12/18 110/60    Pulse Readings from Last 3 Encounters:   07/09/18 59   03/26/18 72   02/12/18 82    SpO2 Readings from Last 3 Encounters:   07/09/18 98%   05/05/17 97%        The mandibular molar Class :   [x]1 []2 []3      Mallampati I Airway Classification:   []1 []2 [x]3 []4        Physical Exam   Constitutional: No distress. HENT:   Mallampati class 3, no retrognathia or hypognathia , normal airflow in bilateral nostrils, no septum deviation , crowded oropharynx with low soft palate, high arched hard palate,no tonsils enlargement. Eyes: EOM are normal.   Neck: Normal range of motion. Neck supple. No tracheal deviation present. No thyromegaly present. Cardiovascular: Normal rate, normal heart sounds and intact distal pulses. Pulmonary/Chest: Effort normal and breath sounds normal. No respiratory distress. She has no wheezes. Musculoskeletal: Normal range of motion. She exhibits no edema or tenderness. Neurological: She is alert. She has normal reflexes. No cranial nerve deficit. Coordination normal.   Skin: Skin is warm. Psychiatric: She has a normal mood and affect. Nursing note and vitals reviewed. Assessment:      Diagnosis Orders   1. Restless leg syndrome  Ferritin    rOPINIRole (REQUIP) 0.5 MG tablet   2. Low iron  Ferritin   3. Chronic insomnia       Plan:      Will treat the RLS wit Requip, I will increase the dose as needed, I will consider adding gabapentin and

## 2018-09-25 ENCOUNTER — TELEPHONE (OUTPATIENT)
Dept: SLEEP MEDICINE | Age: 73
End: 2018-09-25

## 2018-10-17 ENCOUNTER — OFFICE VISIT (OUTPATIENT)
Dept: SLEEP MEDICINE | Age: 73
End: 2018-10-17
Payer: MEDICARE

## 2018-10-17 VITALS
RESPIRATION RATE: 18 BRPM | DIASTOLIC BLOOD PRESSURE: 78 MMHG | BODY MASS INDEX: 22.71 KG/M2 | WEIGHT: 133 LBS | HEART RATE: 52 BPM | OXYGEN SATURATION: 98 % | TEMPERATURE: 97.9 F | HEIGHT: 64 IN | SYSTOLIC BLOOD PRESSURE: 110 MMHG

## 2018-10-17 DIAGNOSIS — F51.04 CHRONIC INSOMNIA: ICD-10-CM

## 2018-10-17 DIAGNOSIS — G25.81 RLS (RESTLESS LEGS SYNDROME): Primary | ICD-10-CM

## 2018-10-17 PROCEDURE — G8399 PT W/DXA RESULTS DOCUMENT: HCPCS | Performed by: PSYCHIATRY & NEUROLOGY

## 2018-10-17 PROCEDURE — G8427 DOCREV CUR MEDS BY ELIG CLIN: HCPCS | Performed by: PSYCHIATRY & NEUROLOGY

## 2018-10-17 PROCEDURE — 1090F PRES/ABSN URINE INCON ASSESS: CPT | Performed by: PSYCHIATRY & NEUROLOGY

## 2018-10-17 PROCEDURE — 4040F PNEUMOC VAC/ADMIN/RCVD: CPT | Performed by: PSYCHIATRY & NEUROLOGY

## 2018-10-17 PROCEDURE — 99212 OFFICE O/P EST SF 10 MIN: CPT | Performed by: PSYCHIATRY & NEUROLOGY

## 2018-10-17 PROCEDURE — 1101F PT FALLS ASSESS-DOCD LE1/YR: CPT | Performed by: PSYCHIATRY & NEUROLOGY

## 2018-10-17 PROCEDURE — 1123F ACP DISCUSS/DSCN MKR DOCD: CPT | Performed by: PSYCHIATRY & NEUROLOGY

## 2018-10-17 PROCEDURE — G8482 FLU IMMUNIZE ORDER/ADMIN: HCPCS | Performed by: PSYCHIATRY & NEUROLOGY

## 2018-10-17 PROCEDURE — 1036F TOBACCO NON-USER: CPT | Performed by: PSYCHIATRY & NEUROLOGY

## 2018-10-17 PROCEDURE — G8420 CALC BMI NORM PARAMETERS: HCPCS | Performed by: PSYCHIATRY & NEUROLOGY

## 2018-10-17 PROCEDURE — 3017F COLORECTAL CA SCREEN DOC REV: CPT | Performed by: PSYCHIATRY & NEUROLOGY

## 2018-10-17 RX ORDER — ROPINIROLE 1 MG/1
TABLET, FILM COATED ORAL
Qty: 90 TABLET | Refills: 3 | Status: SHIPPED | OUTPATIENT
Start: 2018-10-17 | End: 2019-04-17

## 2018-10-17 ASSESSMENT — ENCOUNTER SYMPTOMS: APNEA: 0

## 2019-04-17 ENCOUNTER — OFFICE VISIT (OUTPATIENT)
Dept: SLEEP MEDICINE | Age: 74
End: 2019-04-17
Payer: MEDICARE

## 2019-04-17 ENCOUNTER — TELEPHONE (OUTPATIENT)
Dept: SLEEP MEDICINE | Age: 74
End: 2019-04-17

## 2019-04-17 VITALS
HEART RATE: 67 BPM | OXYGEN SATURATION: 93 % | SYSTOLIC BLOOD PRESSURE: 116 MMHG | RESPIRATION RATE: 16 BRPM | TEMPERATURE: 98.2 F | WEIGHT: 132.2 LBS | DIASTOLIC BLOOD PRESSURE: 78 MMHG | BODY MASS INDEX: 22.57 KG/M2 | HEIGHT: 64 IN

## 2019-04-17 DIAGNOSIS — G25.81 RLS (RESTLESS LEGS SYNDROME): ICD-10-CM

## 2019-04-17 DIAGNOSIS — G47.19 EXCESSIVE DAYTIME SLEEPINESS: ICD-10-CM

## 2019-04-17 DIAGNOSIS — F51.01 PRIMARY INSOMNIA: ICD-10-CM

## 2019-04-17 DIAGNOSIS — R06.89 GASPING FOR BREATH: Primary | ICD-10-CM

## 2019-04-17 PROCEDURE — 1036F TOBACCO NON-USER: CPT | Performed by: PSYCHIATRY & NEUROLOGY

## 2019-04-17 PROCEDURE — G8399 PT W/DXA RESULTS DOCUMENT: HCPCS | Performed by: PSYCHIATRY & NEUROLOGY

## 2019-04-17 PROCEDURE — G8420 CALC BMI NORM PARAMETERS: HCPCS | Performed by: PSYCHIATRY & NEUROLOGY

## 2019-04-17 PROCEDURE — 1123F ACP DISCUSS/DSCN MKR DOCD: CPT | Performed by: PSYCHIATRY & NEUROLOGY

## 2019-04-17 PROCEDURE — 1090F PRES/ABSN URINE INCON ASSESS: CPT | Performed by: PSYCHIATRY & NEUROLOGY

## 2019-04-17 PROCEDURE — 4040F PNEUMOC VAC/ADMIN/RCVD: CPT | Performed by: PSYCHIATRY & NEUROLOGY

## 2019-04-17 PROCEDURE — 3017F COLORECTAL CA SCREEN DOC REV: CPT | Performed by: PSYCHIATRY & NEUROLOGY

## 2019-04-17 PROCEDURE — 99214 OFFICE O/P EST MOD 30 MIN: CPT | Performed by: PSYCHIATRY & NEUROLOGY

## 2019-04-17 PROCEDURE — G8427 DOCREV CUR MEDS BY ELIG CLIN: HCPCS | Performed by: PSYCHIATRY & NEUROLOGY

## 2019-04-17 RX ORDER — ROPINIROLE 1 MG/1
TABLET, FILM COATED ORAL
Qty: 90 TABLET | Refills: 3 | Status: SHIPPED | OUTPATIENT
Start: 2019-04-17 | End: 2019-07-17 | Stop reason: SDUPTHER

## 2019-04-17 RX ORDER — GABAPENTIN 100 MG/1
CAPSULE ORAL
Qty: 60 CAPSULE | Refills: 5 | Status: SHIPPED | OUTPATIENT
Start: 2019-04-17 | End: 2019-07-17

## 2019-04-17 ASSESSMENT — ENCOUNTER SYMPTOMS
GASTROINTESTINAL NEGATIVE: 1
CHOKING: 1

## 2019-04-17 NOTE — PROGRESS NOTES
MD ELISHA Calixto Board Certified in Sleep Medicine  Certified in [address] [place] [address] Certified in Neurology Renata Amy [address]30 [address] [facility], KATIE Shay 69  [phone]   [address] [place] [address], [address] [place] [address]                      [address] [address] [phone]    Subjective:     Patient ID: Elinor Nielson is a 72 y.o. female. Chief Complaint   Patient presents with    Follow-up     meds       HPI:        Elinor Nielson is a 72 y.o. female was seen today as a follow for restless leg syndrome and insomnia. Started having symptoms at 12-1 AM and she took the Requip for it, also around 4-5 PM also would have RLS before she takes the nighttime dose of Requip. Feels herself in light sleep. She wakes up every 2 hours just to look at the clock. Takes only 1/4 Ambien of 5 mg. Had an episode of gasping for air last week during her sleep, she had similar episodes several years ago, has EDS but does not takes naps.        Previous Report(s)Reviewed: historical medical records         Social History     Socioeconomic History    Marital status:      Spouse name: Not on file    Number of children: Not on file    Years of education: Not on file    Highest education level: Not on file   Occupational History    Not on file   Social Needs    Financial resource strain: Not on file    Food insecurity:     Worry: Not on file     Inability: Not on file    Transportation needs:     Medical: Not on file     Non-medical: Not on file   Tobacco Use    Smoking status: Never Smoker    Smokeless tobacco: Never Used   Substance and Sexual Activity    Alcohol use: No    Drug use: No    Sexual activity: Not on file   Lifestyle    Physical activity:     Days per week: Not on file     Minutes per session: Not on file    Stress: Not on file   Relationships    Social connections:     Talks on phone: Not on file     Gets together: Not on file     Attends Cheondoism service: Not on file     Active member of club or organization: Not on file     Attends meetings of clubs or organizations: Not on file     Relationship status: Not on file    Intimate partner violence:     Fear of current or ex partner: Not on file     Emotionally abused: Not on file     Physically abused: Not on file     Forced sexual activity: Not on file   Other Topics Concern    Not on file   Social History Narrative    Not on file       Prior to Admission medications    Medication Sig Start Date End Date Taking? Authorizing Provider   rOPINIRole (REQUIP) 1 MG tablet One tab 2 hours before the bedtime, and one tab BID PRN 4/17/19  Yes Lavinia Gould MD   gabapentin (NEURONTIN) 100 MG capsule 1-2 caps 2 hours 4/17/19 4/17/22 Yes Lavinia Gould MD   Turmeric 450 MG CAPS Take by mouth   Yes Historical Provider, MD   atorvastatin (LIPITOR) 40 MG tablet TAKE 1 TABLET BY MOUTH DAILY 9/17/18  Yes Adri Frank MD   Doxylamine Succinate, Sleep, (UNISOM PO) Take by mouth   Yes Historical Provider, MD   Cyanocobalamin (B-12) 100 MCG TABS Take by mouth   Yes Historical Provider, MD   omeprazole (PRILOSEC OTC) 20 MG tablet Take 20 mg by mouth daily   Yes Historical Provider, MD   Multiple Vitamins-Minerals (MULTIPLE VITAMINS/WOMENS PO) Take by mouth   Yes Historical Provider, MD   Fish Oil-Cholecalciferol (FISH OIL + D3 PO) Take by mouth   Yes Historical Provider, MD   Cholecalciferol (VITAMIN D3) 2000 UNITS CAPS Take by mouth   Yes Historical Provider, MD   ESTRACE VAGINAL 0.1 MG/GM vaginal cream Vaginal cream twice weekly 2/7/12  Yes Historical Provider, MD   aspirin 81 MG tablet Take 81 mg by mouth daily.      Yes Historical Provider, MD       Allergies as of 04/17/2019 - Review Complete 04/17/2019   Allergen Reaction Noted    Nortriptyline  05/14/2018  Tramadol hcl  11/26/2012    Tramadol  06/09/2015       Patient Active Problem List   Diagnosis    Osteopenia    Insomnia    Hyperlipidemia    Anxiety    Essential hypertension       Past Medical History:   Diagnosis Date    Essential hypertension 2/9/2017    Gastric ulcer 8-2006    EGD    Hyperlipidemia        Past Surgical History:   Procedure Laterality Date    COLONOSCOPY  2002, 9/11/12    Haroon Rebel-  due in 2022   [address]  4/2012    Dr. Andrew Callejas (@ )   [facility] SURGERY  8-2008    Dr Ammy Egan  10/06    [facility] for [facility] Higginbotham)       Family History   Problem Relation Age of Onset    Heart Attack Mother         MI    Heart Attack Sister 47        MI (no tobacco use)    High Cholesterol Sister     Hypertension Sister     Heart Disease Sister 47        MI    Heart Attack Father 80        MI    Hypertension Brother     High Cholesterol Brother        Review of Systems   Constitutional: Positive for fatigue (EDS). Respiratory: Positive for choking (gasping for breath during sleep). Cardiovascular: Negative for leg swelling. Gastrointestinal: Negative. Genitourinary: Negative for frequency. Skin: Negative. Neurological: Negative. Psychiatric/Behavioral: Positive for sleep disturbance. Objective:     Vitals:  Weight BMI Neck circumference    Wt Readings from Last 3 Encounters:   04/17/19 132 lb 3.2 oz (60 kg)   02/04/19 131 lb 6.4 oz (59.6 kg)   01/18/19 135 lb 12.8 oz (61.6 kg)    Body mass index is 22.69 kg/m².        BP HR SaO2   BP Readings from Last 3 Encounters:   04/17/19 116/78   02/04/19 110/78   01/18/19 (!) 142/80    Pulse Readings from Last 3 Encounters:   04/17/19 67   02/04/19 60   01/18/19 62    SpO2 Readings from Last 3 Encounters:   04/17/19 93%   10/17/18 98%   07/09/18 98%          Themandibular molar Class :   [x]1 []2 []3      Mallampati I Airway Classification:   []1 []2 [x]3 []4      Physical Exam Constitutional: No distress. HENT:   Nose: Nose normal.   Eyes: EOM are normal.   Neck: Neck supple. Cardiovascular: Normal rate, regular rhythm and normal heart sounds. Pulmonary/Chest: Effort normal and breath sounds normal. No respiratory distress. Musculoskeletal: Normal range of motion. She exhibits no edema. Neurological: She is alert. Skin: Skin is warm. Psychiatric: She has a normal mood and affect. Nursing note and vitals reviewed. Assessment:        Diagnosis Orders   1. Gasping for breath  Baseline Diagnostic Sleep Study   2. RLS (restless legs syndrome)  rOPINIRole (REQUIP) 1 MG tablet    gabapentin (NEURONTIN) 100 MG capsule    Baseline Diagnostic Sleep Study   3. Primary insomnia  gabapentin (NEURONTIN) 100 MG capsule    Baseline Diagnostic Sleep Study   4. Excessive daytime sleepiness  Baseline Diagnostic Sleep Study     Plan: Will add Gabapentin for RLS along with Requip. D/c Dave. Will do PSG for suspected mild JAY, aslo for RSL. Orders Placed This Encounter   Procedures    Baseline Diagnostic Sleep Study       Return in about 3 months (around 7/17/2019) for insomnia, PSG.     Eleni Parrish MD  Medical Director - [facility]

## 2019-04-17 NOTE — PATIENT INSTRUCTIONS
worry when you lie down, start a worry book. Well before bedtime, write down your worries, and then set the book and your concerns aside. · Try meditation or other relaxation techniques before you go to bed. · If you cannot fall asleep, get up and go to another room until you feel sleepy. Do something relaxing. Repeat your bedtime routine before you go to bed again. · Make your house quiet and calm about an hour before bedtime. Turn down the lights, turn off the TV, log off the computer, and turn down the volume on music. This can help you relax after a busy day. When should you call for help? Watch closely for changes in your health, and be sure to contact your doctor if:    · Your efforts to improve your sleep do not work.     · Your insomnia gets worse.     · You have been feeling down, depressed, or hopeless or have lost interest in things that you usually enjoy. Where can you learn more? Go to https://[email or web address].[email or web address]. org and sign in to your [employer] account. Enter P513 in the Right Hemisphere box to learn more about \"Insomnia: Care Instructions. \"     If you do not have an account, please click on the \"Sign Up Now\" link. Current as of: June 28, 2018  Content Version: 11.9  © 4075-3819 [employer]. Care instructions adapted under license by [facility] ([facility]). If you have questions about a medical condition or this instruction, always ask your healthcare professional. [place] [address] any warranty or liability for your use of this information.

## 2019-05-24 ENCOUNTER — HOSPITAL ENCOUNTER (OUTPATIENT)
Dept: SLEEP CENTER | Age: 74
Discharge: HOME | End: 2019-05-24
Payer: MEDICARE

## 2019-05-24 DIAGNOSIS — G25.81 RLS (RESTLESS LEGS SYNDROME): ICD-10-CM

## 2019-05-24 DIAGNOSIS — R06.89 GASPING FOR BREATH: ICD-10-CM

## 2019-05-24 DIAGNOSIS — G47.19 EXCESSIVE DAYTIME SLEEPINESS: ICD-10-CM

## 2019-05-24 DIAGNOSIS — F51.01 PRIMARY INSOMNIA: ICD-10-CM

## 2019-05-24 PROCEDURE — 95810 POLYSOM 6/> YRS 4/> PARAM: CPT

## 2019-05-28 PROCEDURE — 95810 POLYSOM 6/> YRS 4/> PARAM: CPT | Performed by: PSYCHIATRY & NEUROLOGY

## 2019-06-03 ENCOUNTER — TELEPHONE (OUTPATIENT)
Dept: PULMONOLOGY | Age: 74
End: 2019-06-03

## 2019-06-03 NOTE — TELEPHONE ENCOUNTER
Phone call to patient with her PSG results. Her AHI was 0.0 and she did not desaturate. She expressed understanding.

## 2019-07-17 ENCOUNTER — OFFICE VISIT (OUTPATIENT)
Dept: SLEEP MEDICINE | Age: 74
End: 2019-07-17
Payer: MEDICARE

## 2019-07-17 VITALS
WEIGHT: 133.4 LBS | SYSTOLIC BLOOD PRESSURE: 120 MMHG | BODY MASS INDEX: 22.77 KG/M2 | DIASTOLIC BLOOD PRESSURE: 72 MMHG | OXYGEN SATURATION: 92 % | HEART RATE: 69 BPM | HEIGHT: 64 IN | RESPIRATION RATE: 16 BRPM

## 2019-07-17 DIAGNOSIS — G25.81 RLS (RESTLESS LEGS SYNDROME): Primary | ICD-10-CM

## 2019-07-17 DIAGNOSIS — G25.81 RLS (RESTLESS LEGS SYNDROME): ICD-10-CM

## 2019-07-17 DIAGNOSIS — G47.34 NOCTURNAL HYPOXEMIA: ICD-10-CM

## 2019-07-17 PROCEDURE — G8427 DOCREV CUR MEDS BY ELIG CLIN: HCPCS | Performed by: PSYCHIATRY & NEUROLOGY

## 2019-07-17 PROCEDURE — 4040F PNEUMOC VAC/ADMIN/RCVD: CPT | Performed by: PSYCHIATRY & NEUROLOGY

## 2019-07-17 PROCEDURE — 1090F PRES/ABSN URINE INCON ASSESS: CPT | Performed by: PSYCHIATRY & NEUROLOGY

## 2019-07-17 PROCEDURE — G8399 PT W/DXA RESULTS DOCUMENT: HCPCS | Performed by: PSYCHIATRY & NEUROLOGY

## 2019-07-17 PROCEDURE — 1036F TOBACCO NON-USER: CPT | Performed by: PSYCHIATRY & NEUROLOGY

## 2019-07-17 PROCEDURE — G8420 CALC BMI NORM PARAMETERS: HCPCS | Performed by: PSYCHIATRY & NEUROLOGY

## 2019-07-17 PROCEDURE — 3017F COLORECTAL CA SCREEN DOC REV: CPT | Performed by: PSYCHIATRY & NEUROLOGY

## 2019-07-17 PROCEDURE — 1123F ACP DISCUSS/DSCN MKR DOCD: CPT | Performed by: PSYCHIATRY & NEUROLOGY

## 2019-07-17 PROCEDURE — 99213 OFFICE O/P EST LOW 20 MIN: CPT | Performed by: PSYCHIATRY & NEUROLOGY

## 2019-07-17 RX ORDER — GABAPENTIN 300 MG/1
CAPSULE ORAL
Qty: 60 CAPSULE | Refills: 5 | Status: SHIPPED | OUTPATIENT
Start: 2019-07-17 | End: 2019-12-30

## 2019-07-17 RX ORDER — ROPINIROLE 1 MG/1
TABLET, FILM COATED ORAL
Qty: 90 TABLET | Refills: 5 | Status: SHIPPED | OUTPATIENT
Start: 2019-07-17 | End: 2019-07-17 | Stop reason: SDUPTHER

## 2019-07-17 RX ORDER — ROPINIROLE 1 MG/1
TABLET, FILM COATED ORAL
Qty: 270 TABLET | Refills: 1 | Status: SHIPPED | OUTPATIENT
Start: 2019-07-17 | End: 2020-03-09 | Stop reason: SDUPTHER

## 2019-07-17 ASSESSMENT — ENCOUNTER SYMPTOMS
CHOKING: 0
APNEA: 0

## 2019-07-17 NOTE — PROGRESS NOTES
MD ELISHA Andino Board Certified in Sleep Medicine  Certified in [address] [place] [address] Certified in Neurology Renata Reyes [address],  [place]   [phone]   [address] [place] [address]  [address] [place] [address], [address]                [address]  [address] [place] [address] [phone]    Subjective:     Patient ID: Lisa Nance is a 69 y.o. female. Chief Complaint   Patient presents with    Follow-up     normal PSG       HPI:        Lisa Nance is a 69 y.o. female was seen today as a follow for PSG result. The patient underwent comprehensive polysomnogram on 05/24/2019, the overnight registration revealed no sleep disordered breathing with apnea hypopnea index of 0.0/h with lowest O2 saturation of 86%, patient spent about 62.5 minutes below 90%, the mean O2 was 92%  Takes Requip for RLS, but sometimes makes her sleepy during the daytime, the Gabapentin total 200 mg helps her to fall in sleep but not staying in sleep.   DOT/CDL - N/A        Previous Report(s)Reviewed: historical medical records         Social History     Socioeconomic History    Marital status:      Spouse name: Not on file    Number of children: Not on file    Years of education: Not on file    Highest education level: Not on file   Occupational History    Not on file   Social Needs    Financial resource strain: Not on file    Food insecurity:     Worry: Not on file     Inability: Not on file    Transportation needs:     Medical: Not on file     Non-medical: Not on file   Tobacco Use    Smoking status: Never Smoker    Smokeless tobacco: Never Used   Substance and Sexual Activity    Alcohol use: No    Drug use: No    Sexual activity: Not on file   Lifestyle    Physical activity:     Days per week: Not on file     Minutes per session: Not on Nortriptyline  05/14/2018    Tramadol hcl  11/26/2012    Tramadol  06/09/2015       Patient Active Problem List   Diagnosis    Osteopenia    Insomnia    Hyperlipidemia    Anxiety    Essential hypertension    Hypersomnia    RLS (restless legs syndrome)    Gasping for breath       Past Medical History:   Diagnosis Date    Essential hypertension 2/9/2017    Gastric ulcer 8-2006    EGD    Hyperlipidemia        Past Surgical History:   Procedure Laterality Date    COLONOSCOPY  2002, 9/11/12    Jenniferni-  due in 2022   [address] [facility] SURGERY  4/2012    Dr. Kathryn Burnham (@ )   [facility] SURGERY  8-2008    Dr Xavier Marcano  10/06    [facility] for [employer])       Family History   Problem Relation Age of Onset    Heart Attack Mother         MI    Heart Attack Sister 47        MI (no tobacco use)    High Cholesterol Sister     Hypertension Sister     Heart Disease Sister 47        MI    Heart Attack Father 80        MI    Hypertension Brother     High Cholesterol Brother        Review of Systems   Constitutional: Positive for fatigue. Respiratory: Negative for apnea and choking. Genitourinary: Negative for frequency. Psychiatric/Behavioral: Positive for sleep disturbance. All other systems reviewed and are negative. Objective:     Vitals:  Weight BMI Neck circumference    Wt Readings from Last 3 Encounters:   07/17/19 133 lb 6.4 oz (60.5 kg)   07/11/19 135 lb 9.6 oz (61.5 kg)   04/17/19 132 lb 3.2 oz (60 kg)    Body mass index is 22.9 kg/m².        BP HR SaO2   BP Readings from Last 3 Encounters:   07/17/19 120/72   07/11/19 120/80   04/17/19 116/78    Pulse Readings from Last 3 Encounters:   07/17/19 69   07/11/19 68   04/17/19 67    SpO2 Readings from Last 3 Encounters:   07/17/19 92%   04/17/19 93%   10/17/18 98%        Themandibular molar Class :   [x]1 []2 []3      Mallampati I Airway Classification:   []1 []2 [x]3 []4      Physical Exam   Constitutional: No distress. HENT:   Nose: Nose normal.   Eyes: EOM are normal.   Neck: Neck supple. Cardiovascular: Normal rate, regular rhythm and normal heart sounds. Pulmonary/Chest: Effort normal and breath sounds normal. No respiratory distress. Musculoskeletal: Normal range of motion. She exhibits no edema. Neurological: She is alert. Skin: Skin is warm. Psychiatric: She has a normal mood and affect. Nursing note and vitals reviewed. Assessment:   .     Diagnosis Orders   1. RLS (restless legs syndrome)  rOPINIRole (REQUIP) 1 MG tablet    gabapentin (NEURONTIN) 300 MG capsule   2. Nocturnal hypoxemia       Plan: Will keep the Requip 1 mg TID  I Increased the Gabapentin to 300-600 mg QHS  Pulmonary consult for nocturnal hypoxemia and mean low O2    No orders of the defined types were placed in this encounter. Return in about 1 year (around 7/17/2020).     Loco Baker MD  Medical Director - [facility]

## 2019-07-17 NOTE — PATIENT INSTRUCTIONS
to your doctor about stop-smoking programs and medicines. These can increase your chances of quitting for good. · Do not drink alcohol late in the evening. Take steps to help you sleep better  · Get plenty of sunlight in the outdoors, particularly later in the afternoon. · Use the evening hours for settling down. Avoid activities that challenge you in the hours before bedtime. · Eat meals at regular times, and do not snack before bedtime. · Keep your bedroom quiet, dark, and cool. Try using a sleep mask and earplugs to help you sleep. · Limit how much you drink at night to reduce your need to get up to urinate. But do not go to bed thirsty. · Run a fan or other steady \"white noise\" during the night if noises wake you up. · [place] the bed for sleeping and sex. Do your reading or TV watching in another room. · Once you are in bed, relax from head to toe, and guide your mind to pleasant thoughts. · Do not stay in bed longer than 8 hours, and try to avoid naps. When should you call for help? Watch closely for changes in your health, and be sure to contact your doctor if:    · You are still not getting enough sleep.     · Your symptoms become more severe or happen more often. Where can you learn more? Go to https://[employer]. org and sign in to your Melon account. Enter F330 in the [facility] box to learn more about \"Restless Legs Syndrome: Care Instructions. \"     If you do not have an account, please click on the \"Sign Up Now\" link. Current as of: Tram 3, 2018  Content Version: 12.0  © 5733-6935 Healthwise, Incorporated. Care instructions adapted under license by [facility] ([facility]). If you have questions about a medical condition or this instruction, always ask your healthcare professional. [place] [address] any warranty or liability for your use of this information.          Patient Education        Restless Legs Syndrome: Care Instructions  Your Care Instructions  Restless legs syndrome is a common nervous system problem. People with this syndrome feel a creeping, achy, or unpleasant feeling in the legs and an overpowering urge to move them. It often occurs in the evening and at night and can lead to sleep problems and tiredness. Your doctor may suggest doing a study of your sleep patterns to figure out what is happening when you try to sleep. Many people get relief from symptoms when they get regular exercise, eat well, and avoid caffeine, alcohol, and tobacco.  Follow-up care is a key part of your treatment and safety. Be sure to make and go to all appointments, and call your doctor if you are having problems. It's also a good idea to know your test results and keep a list of the medicines you take. How can you care for yourself at home? · Take your medicines exactly as prescribed. Call your doctor if you think you are having a problem with your medicine. · Try bathing in hot or cold water. Applying a heating pad or ice bag to your legs may also help symptoms. · Stretch and massage your legs before bed or when discomfort begins. · Get some exercise for at least 30 minutes a day on most days of the week. Stop exercising at least 3 hours before bedtime. · Try to plan for situations where you will need to remain seated for long stretches. For example, if you are traveling by car, plan some stops so you can get out and walk around. · Tell your doctor about any medicines you are taking. This includes all over-the-counter, prescription, and herbal medicines. Some medicines, such as antidepressants, antihistamines, and cold and sinus medicines, can make your symptoms worse. · Avoid caffeine products, such as coffee, tea, cola, and chocolate. Caffeine can interrupt your sleep and stimulate you. · Do not smoke. Nicotine can make restless legs worse. If you need help quitting, talk to your doctor about stop-smoking programs and medicines.  These can increase

## 2019-08-21 ENCOUNTER — OFFICE VISIT (OUTPATIENT)
Dept: PULMONOLOGY | Age: 74
End: 2019-08-21
Payer: MEDICARE

## 2019-08-21 VITALS
OXYGEN SATURATION: 96 % | RESPIRATION RATE: 16 BRPM | DIASTOLIC BLOOD PRESSURE: 68 MMHG | HEIGHT: 64 IN | SYSTOLIC BLOOD PRESSURE: 106 MMHG | HEART RATE: 57 BPM | TEMPERATURE: 97.7 F | BODY MASS INDEX: 22.9 KG/M2

## 2019-08-21 DIAGNOSIS — G47.34 NOCTURNAL HYPOXIA: Primary | ICD-10-CM

## 2019-08-21 PROCEDURE — 3017F COLORECTAL CA SCREEN DOC REV: CPT | Performed by: INTERNAL MEDICINE

## 2019-08-21 PROCEDURE — 1123F ACP DISCUSS/DSCN MKR DOCD: CPT | Performed by: INTERNAL MEDICINE

## 2019-08-21 PROCEDURE — 4040F PNEUMOC VAC/ADMIN/RCVD: CPT | Performed by: INTERNAL MEDICINE

## 2019-08-21 PROCEDURE — 1090F PRES/ABSN URINE INCON ASSESS: CPT | Performed by: INTERNAL MEDICINE

## 2019-08-21 PROCEDURE — G8420 CALC BMI NORM PARAMETERS: HCPCS | Performed by: INTERNAL MEDICINE

## 2019-08-21 PROCEDURE — 1036F TOBACCO NON-USER: CPT | Performed by: INTERNAL MEDICINE

## 2019-08-21 PROCEDURE — 99204 OFFICE O/P NEW MOD 45 MIN: CPT | Performed by: INTERNAL MEDICINE

## 2019-08-21 PROCEDURE — G8427 DOCREV CUR MEDS BY ELIG CLIN: HCPCS | Performed by: INTERNAL MEDICINE

## 2019-08-21 PROCEDURE — G8399 PT W/DXA RESULTS DOCUMENT: HCPCS | Performed by: INTERNAL MEDICINE

## 2019-10-14 RX ORDER — TRIAMTERENE AND HYDROCHLOROTHIAZIDE 37.5; 25 MG/1; MG/1
1 TABLET ORAL DAILY
Qty: 90 TABLET | Refills: 3 | Status: SHIPPED | OUTPATIENT
Start: 2019-10-14 | End: 2020-08-25

## 2019-12-29 DIAGNOSIS — G25.81 RLS (RESTLESS LEGS SYNDROME): ICD-10-CM

## 2019-12-30 RX ORDER — GABAPENTIN 300 MG/1
CAPSULE ORAL
Qty: 60 CAPSULE | Refills: 5 | Status: SHIPPED | OUTPATIENT
Start: 2019-12-30 | End: 2023-12-30

## 2020-02-20 ENCOUNTER — TELEPHONE (OUTPATIENT)
Dept: PULMONOLOGY | Age: 75
End: 2020-02-20

## 2020-02-20 NOTE — TELEPHONE ENCOUNTER
Called Ingrid Briones to try to determine how she is currently taking the requip. She said typically she takes 1-1mg tablet around 9pm, and then takes another 1mg tablet around 11-11:30 because the first one did not help. She has not taken any daytime requip for 2-3 weeks. Feels the RLS is getting worse at night than it had been. Last Friday night they had a dinner party in the late evening and had had a couple glasses of wine. Took 1mg around 9:15pm, another 1mg at 11:15 and at 1:00am she took another 1mg because the RLS was really bothering her. At 5:30am she got up to go to the bathroom. She felt dizzy and had a headache. She went to take some aspirin and before she could take them she passed out, split the back of her head on the floor- ER stapled. She is wondering if the three requip along with the wine may have caused this or the fact that over the past 3 years she has had 3 occasions where she has felt like she is going to faint and she sits down to avoid fainting. On one occasion she \"passed out in a bowl of payaya\". Said Dr Kodak Aldana is her PCP and he mentioned a \"vaso-vagal\" response as possible cause? Please advise.   She has an appt with Dr Kodak Aldana tomorrow as well

## 2020-02-20 NOTE — TELEPHONE ENCOUNTER
She is taking 2 tabs at the bed time plus one tab twice a day as needed.   If she needs more , will increase the dose or will try Neupro patch

## 2020-02-20 NOTE — TELEPHONE ENCOUNTER
Patient called and states her RLS is getting worse and she is on requip. She asked if shes allowed to take more at a time or what she should do?     Please advise

## 2020-02-26 NOTE — TELEPHONE ENCOUNTER
Patient said that she spoke to Dr. Uriah Madden and he didn't know anything about the patch. Patient would like to have a referral to Dr. Ashley Reynaga at [place] [address] fax [phone] for a 2nd opinion.

## 2020-03-06 ENCOUNTER — HOSPITAL ENCOUNTER (OUTPATIENT)
Dept: NON INVASIVE DIAGNOSTICS | Age: 75
Discharge: HOME | End: 2020-03-06
Payer: MEDICARE

## 2020-03-06 LAB
LV EF: 58 %
LVEF MODALITY: NORMAL

## 2020-03-06 PROCEDURE — 93306 TTE W/DOPPLER COMPLETE: CPT

## 2020-03-09 ENCOUNTER — OFFICE VISIT (OUTPATIENT)
Dept: SLEEP MEDICINE | Age: 75
End: 2020-03-09
Payer: MEDICARE

## 2020-03-09 VITALS
WEIGHT: 138 LBS | BODY MASS INDEX: 23.56 KG/M2 | OXYGEN SATURATION: 95 % | RESPIRATION RATE: 16 BRPM | TEMPERATURE: 97.9 F | HEIGHT: 64 IN | DIASTOLIC BLOOD PRESSURE: 62 MMHG | HEART RATE: 60 BPM | SYSTOLIC BLOOD PRESSURE: 130 MMHG

## 2020-03-09 PROCEDURE — 1090F PRES/ABSN URINE INCON ASSESS: CPT | Performed by: PSYCHIATRY & NEUROLOGY

## 2020-03-09 PROCEDURE — 3017F COLORECTAL CA SCREEN DOC REV: CPT | Performed by: PSYCHIATRY & NEUROLOGY

## 2020-03-09 PROCEDURE — 1036F TOBACCO NON-USER: CPT | Performed by: PSYCHIATRY & NEUROLOGY

## 2020-03-09 PROCEDURE — G8399 PT W/DXA RESULTS DOCUMENT: HCPCS | Performed by: PSYCHIATRY & NEUROLOGY

## 2020-03-09 PROCEDURE — G8420 CALC BMI NORM PARAMETERS: HCPCS | Performed by: PSYCHIATRY & NEUROLOGY

## 2020-03-09 PROCEDURE — G8482 FLU IMMUNIZE ORDER/ADMIN: HCPCS | Performed by: PSYCHIATRY & NEUROLOGY

## 2020-03-09 PROCEDURE — G8427 DOCREV CUR MEDS BY ELIG CLIN: HCPCS | Performed by: PSYCHIATRY & NEUROLOGY

## 2020-03-09 PROCEDURE — 99213 OFFICE O/P EST LOW 20 MIN: CPT | Performed by: PSYCHIATRY & NEUROLOGY

## 2020-03-09 PROCEDURE — 4040F PNEUMOC VAC/ADMIN/RCVD: CPT | Performed by: PSYCHIATRY & NEUROLOGY

## 2020-03-09 PROCEDURE — 1123F ACP DISCUSS/DSCN MKR DOCD: CPT | Performed by: PSYCHIATRY & NEUROLOGY

## 2020-03-09 RX ORDER — ROPINIROLE 1 MG/1
TABLET, FILM COATED ORAL
Qty: 180 TABLET | Refills: 3 | Status: SHIPPED | OUTPATIENT
Start: 2020-03-09

## 2020-03-09 RX ORDER — FERROUS SULFATE 325(65) MG
325 TABLET ORAL
Qty: 90 TABLET | Refills: 1 | Status: SHIPPED | OUTPATIENT
Start: 2020-03-09

## 2020-03-09 ASSESSMENT — ENCOUNTER SYMPTOMS: APNEA: 0

## 2020-03-09 NOTE — PROGRESS NOTES
Provider, MD   rotigotine (NEUPRO) 2 MG/24HR Place 1 patch onto the skin daily Stop taking the Requip  Patient not taking: Reported on 3/9/2020 2/21/20   Tabby Gill MD       Allergies as of 03/09/2020 - Review Complete 03/09/2020   Allergen Reaction Noted    Nortriptyline  05/14/2018    Tramadol hcl  11/26/2012    Tramadol  06/09/2015       Patient Active Problem List   Diagnosis    Osteopenia    Insomnia    Hyperlipidemia    Anxiety    Essential hypertension    Hypersomnia    RLS (restless legs syndrome)    Gasping for breath       Past Medical History:   Diagnosis Date    Essential hypertension 2/9/2017    Gastric ulcer 8-2006    EGD    Hyperlipidemia        Past Surgical History:   Procedure Laterality Date    COLONOSCOPY  2002, 9/11/12    Kari Belling-  due in 2022   [address]  4/2012    Dr. Maciej Wesley (@ )   [facility] SURGERY  8-2008    Dr Janie Juarez  10/06    [facility] for [facility] Gareth Calle)       Family History   Problem Relation Age of Onset    Heart Attack Mother         MI    Heart Attack Sister 47        MI (no tobacco use)    High Cholesterol Sister     Hypertension Sister     Heart Disease Sister 47        MI    Heart Attack Father 80        MI    Hypertension Brother     High Cholesterol Brother      Review of Systems   Constitutional: Negative for fatigue. Respiratory: Negative for apnea. Cardiovascular: Negative for leg swelling. Genitourinary: Negative for frequency. Neurological: Negative for headaches. Objective:     Vitals:  Weight BMI Neck circumference    Wt Readings from Last 3 Encounters:   03/09/20 138 lb (62.6 kg)   02/21/20 137 lb 9.6 oz (62.4 kg)   10/30/19 138 lb (62.6 kg)    Body mass index is 23.69 kg/m².        BP HR SaO2   BP Readings from Last 3 Encounters:   03/09/20 130/62   02/21/20 118/72   10/30/19 118/74    Pulse Readings from Last 3 Encounters:   03/09/20 60   02/21/20 61   10/30/19 67    SpO2 Readings from Last 3 Encounters:   03/09/20 95%   02/21/20 99%   10/30/19 97%        Themandibular molar Class :   [x]1 []2 []3      Mallampati I Airway Classification:   []1 []2 [x]3 []4      Physical Exam  Vitals signs and nursing note reviewed. Constitutional:       Appearance: Normal appearance. HENT:      Head: Atraumatic. Nose: Nose normal.      Mouth/Throat:      Mouth: Mucous membranes are moist.   Eyes:      Extraocular Movements: Extraocular movements intact. Neck:      Musculoskeletal: Normal range of motion and neck supple. Cardiovascular:      Rate and Rhythm: Normal rate and regular rhythm. Heart sounds: Normal heart sounds. Pulmonary:      Effort: Pulmonary effort is normal.      Breath sounds: Normal breath sounds. Musculoskeletal: Normal range of motion. General: No swelling. Skin:     General: Skin is warm. Neurological:      General: No focal deficit present. Psychiatric:         Mood and Affect: Mood normal.         Assessment:   RLS with augmentation phenomena      Diagnosis Orders   1. RLS (restless legs syndrome)  Ferritin    rOPINIRole (REQUIP) 1 MG tablet   2. Low iron  Ferritin    ferrous sulfate (IRON 325) 325 (65 Fe) MG tablet     Plan: Will continue 1 mg 2 hours before the bedtime. 1/2 mg BiD at the time she find the most effective like 3-4 PM if 50% of the time she has RLS symptoms. Will start Iron, continue the Gabapentin at the bedtime. The Neupro is too expensive. Orders Placed This Encounter   Procedures    Ferritin       No follow-ups on file.     Renetta Gibson MD  Medical Director - [facility]

## 2020-06-10 ENCOUNTER — TELEPHONE (OUTPATIENT)
Dept: PULMONOLOGY | Age: 75
End: 2020-06-10

## 2020-06-23 ENCOUNTER — TELEPHONE (OUTPATIENT)
Dept: PULMONOLOGY | Age: 75
End: 2020-06-23

## 2020-06-23 NOTE — TELEPHONE ENCOUNTER
Pt called in and is now seeing Dr. Loreto Vasques with  for pulmonary. Pt requesting we fax over results of Oximetry from 2019 to Dr. Loreto Vasques. I called Dr. Louise Sotelo office to get fax number, [phone]. Report faxed today.

## 2021-03-18 DIAGNOSIS — Z01.818 PRE-OP EVALUATION: ICD-10-CM

## 2021-03-18 DIAGNOSIS — I10 ESSENTIAL HYPERTENSION: ICD-10-CM

## 2021-03-19 LAB
ANION GAP SERPL CALCULATED.3IONS-SCNC: 11 MMOL/L (ref 3–16)
BUN BLDV-MCNC: 13 MG/DL (ref 7–20)
CALCIUM SERPL-MCNC: 9.5 MG/DL (ref 8.3–10.6)
CHLORIDE BLD-SCNC: 102 MMOL/L (ref 99–110)
CO2: 27 MMOL/L (ref 21–32)
CREAT SERPL-MCNC: 0.8 MG/DL (ref 0.6–1.2)
GFR AFRICAN AMERICAN: >60
GFR NON-AFRICAN AMERICAN: >60
GLUCOSE BLD-MCNC: 83 MG/DL (ref 70–99)
POTASSIUM SERPL-SCNC: 4.6 MMOL/L (ref 3.5–5.1)
SODIUM BLD-SCNC: 140 MMOL/L (ref 136–145)

## 2021-05-21 RX ORDER — TRIAMTERENE AND HYDROCHLOROTHIAZIDE 37.5; 25 MG/1; MG/1
1 TABLET ORAL DAILY
Qty: 90 TABLET | Refills: 1 | Status: SHIPPED | OUTPATIENT
Start: 2021-05-21 | End: 2021-11-17

## 2021-05-21 RX ORDER — ATORVASTATIN CALCIUM 40 MG/1
40 TABLET, FILM COATED ORAL DAILY
Qty: 90 TABLET | Refills: 1 | Status: SHIPPED | OUTPATIENT
Start: 2021-05-21 | End: 2021-09-22 | Stop reason: ALTCHOICE

## 2021-09-28 ENCOUNTER — OFFICE VISIT (OUTPATIENT)
Dept: ORTHOPEDIC SURGERY | Age: 76
End: 2021-09-28
Payer: MEDICARE

## 2021-09-28 VITALS
HEIGHT: 64 IN | DIASTOLIC BLOOD PRESSURE: 71 MMHG | BODY MASS INDEX: 23.05 KG/M2 | HEART RATE: 54 BPM | WEIGHT: 135 LBS | SYSTOLIC BLOOD PRESSURE: 129 MMHG

## 2021-09-28 DIAGNOSIS — M25.551 HIP PAIN, ACUTE, RIGHT: ICD-10-CM

## 2021-09-28 DIAGNOSIS — M46.1 SACROILIITIS (HCC): ICD-10-CM

## 2021-09-28 DIAGNOSIS — M16.11 ARTHRITIS OF RIGHT HIP: ICD-10-CM

## 2021-09-28 DIAGNOSIS — M25.551 GREATER TROCHANTERIC PAIN SYNDROME OF RIGHT LOWER EXTREMITY: Primary | ICD-10-CM

## 2021-09-28 PROCEDURE — 4040F PNEUMOC VAC/ADMIN/RCVD: CPT | Performed by: ORTHOPAEDIC SURGERY

## 2021-09-28 PROCEDURE — 1036F TOBACCO NON-USER: CPT | Performed by: ORTHOPAEDIC SURGERY

## 2021-09-28 PROCEDURE — 1090F PRES/ABSN URINE INCON ASSESS: CPT | Performed by: ORTHOPAEDIC SURGERY

## 2021-09-28 PROCEDURE — 99203 OFFICE O/P NEW LOW 30 MIN: CPT | Performed by: ORTHOPAEDIC SURGERY

## 2021-09-28 PROCEDURE — G8420 CALC BMI NORM PARAMETERS: HCPCS | Performed by: ORTHOPAEDIC SURGERY

## 2021-09-28 PROCEDURE — 1123F ACP DISCUSS/DSCN MKR DOCD: CPT | Performed by: ORTHOPAEDIC SURGERY

## 2021-09-28 PROCEDURE — 3017F COLORECTAL CA SCREEN DOC REV: CPT | Performed by: ORTHOPAEDIC SURGERY

## 2021-09-28 PROCEDURE — G8399 PT W/DXA RESULTS DOCUMENT: HCPCS | Performed by: ORTHOPAEDIC SURGERY

## 2021-09-28 PROCEDURE — G8427 DOCREV CUR MEDS BY ELIG CLIN: HCPCS | Performed by: ORTHOPAEDIC SURGERY

## 2021-09-28 NOTE — PATIENT INSTRUCTIONS
Impression:   1. Her pain is primarily muscular related to paraspinous insertion onto the sacrum as well as gluteal muscles predominantly the gluteus thee insertion into the area just below the greater trochanter. I believe these are all related to initial deconditioning due to nonweightbearing. 2.  There is no physical exam evidence of radiculopathy or pain of right hip joint origin. Plan/Treatment:   1. She is started in physical therapy for stretching and strengthening program for her paraspinous and right gluteal musculature. 2.  If she does not she show definite improvement in the next 4 to 6 weeks to return for reevaluation including possible ultrasound evaluation of the trochanteric insertions.     Yola Bradford MD  9/28/2021

## 2021-09-28 NOTE — PROGRESS NOTES
INITIAL EVALUATION OF THE HIP                                9/28/2021    Madai Lao     1945       History of Present Illness:    Greg Marinelli is seen for Hip Pain (Right hip)    Greg Marinelli is a 72-year-old woman sent by Dr. Luana Hankins for evaluation of right hip pain which started around July 2021. She states that she did have a fusion of her right foot great toe  MP joint by Dr. Miranda Morales in March 2021 and was on a knee scooter for several months. When she began to weight-bear again on the right lower extremity and started  a working out in a pool she began having pain in her right buttock which eventually radiated to the lateral aspect of her right hip and then into her right groin. She denies any numbness or weakness down the right lower extremity. The pain is worse in the morning. There is no pain at rest but pain up to 5 with activities such as walking. She states naproxen given to her by Dr. Luana Hankins helped minimally. She denied any previous similar symptoms. She denied any fall or trauma. I have today reviewed with Madai Lao the clinically relevant past medical history, medications, allergies, family history, and Review of Systems from the patients most recent history form, and I have documented any details relevant to today's presenting complaints in my history above. The patient's self recorded documents concerning the above have been scanned  into the chart under the \"Media\" tab.         /71   Pulse 54   Ht 5' 4\" (1.626 m)   Wt 135 lb (61.2 kg)   BMI 23.17 kg/m²     Physical examination    General Appearance: no acute distress, alert, oriented x 3  Limps when walking: no  Leg Length Discrepancy: no  Palpitation Tenderness: yes - mild greater trochanter and Rt SI joint - gluteus thee insertion    Pulses (0-4) Dorsalis  Pedis Posterior  Tibialis   Right  2+   Left  2+     Range of Motion of hip: in degrees   Flexion External  Rotation Internal  Rotation Extension Adduction Abduction   Right 100 30 15 0 15 40   Left 100 40 15 0 15 40     Motor Exam:                        Normal=N   Weak=W   Unable=U   Toe Walk Heel Walk Single Leg Squat   Right Normal Normal Normal   Left Normal Normal Normal      Strength Scale: scale of 1-5   Extensor Hallucis Longus L5 Anterior Tibialis L4 Quadraceps L2,3,4   Right 5 5 5   Left 5 5 5     Reflex Exam: Scale of 0-4   Achilles Tendon (gastroc)   S1 Patellar Tendon (quads) L4   Right 1+ 2+   Left 1+ 2+     Sensory Exam  Intact    Special Testing:     Log Roll Paul  (SYD) FADIR Straight Leg Raising [facility] Trendelenberg Iliopsoas Stress Test   Right neg   + neg neg neg neg neg   Left neg neg neg neg neg neg neg     Piriformis test  Negative       X-Ray Findings taken in Office: 3 views of the right hip were read by myself and shows only minimal degenerative changes of the right hip with no loss of joint space and no significant osteophytic changes of her right femoral head. She does have relative osteopenia but no evidence of fracture or lytic disease is noted. Impression:   1. Her pain is primarily muscular related to paraspinous insertion onto the sacrum as well as gluteal muscles predominantly the gluteus thee insertion into the area just below the greater trochanter. I believe these are all related to initial deconditioning due to nonweightbearing. 2.  There is no physical exam evidence of radiculopathy or pain of right hip joint origin. Plan/Treatment:   1. She is started in physical therapy for stretching and strengthening program for her paraspinous and right gluteal musculature. 2.  If she does not she show definite improvement in the next 4 to 6 weeks to return for reevaluation including possible ultrasound evaluation of the trochanteric insertions. Gurvinder Zarate MD  9/28/2021    This dictation was done with Dragon dictation and may contain mechanical errors related to translation.

## 2021-11-17 RX ORDER — TRIAMTERENE AND HYDROCHLOROTHIAZIDE 37.5; 25 MG/1; MG/1
1 TABLET ORAL DAILY
Qty: 90 TABLET | Refills: 1 | Status: SHIPPED | OUTPATIENT
Start: 2021-11-17 | End: 2022-05-16

## 2021-11-30 ENCOUNTER — OFFICE VISIT (OUTPATIENT)
Dept: ORTHOPEDIC SURGERY | Age: 76
End: 2021-11-30
Payer: MEDICARE

## 2021-11-30 VITALS — HEIGHT: 64 IN | WEIGHT: 135 LBS | BODY MASS INDEX: 23.05 KG/M2

## 2021-11-30 DIAGNOSIS — M25.551 GREATER TROCHANTERIC PAIN SYNDROME OF RIGHT LOWER EXTREMITY: Primary | ICD-10-CM

## 2021-11-30 DIAGNOSIS — M76.891 ENTHESOPATHY OF RIGHT HIP REGION: ICD-10-CM

## 2021-11-30 PROCEDURE — G8420 CALC BMI NORM PARAMETERS: HCPCS | Performed by: ORTHOPAEDIC SURGERY

## 2021-11-30 PROCEDURE — 4040F PNEUMOC VAC/ADMIN/RCVD: CPT | Performed by: ORTHOPAEDIC SURGERY

## 2021-11-30 PROCEDURE — 1123F ACP DISCUSS/DSCN MKR DOCD: CPT | Performed by: ORTHOPAEDIC SURGERY

## 2021-11-30 PROCEDURE — 20611 DRAIN/INJ JOINT/BURSA W/US: CPT | Performed by: ORTHOPAEDIC SURGERY

## 2021-11-30 PROCEDURE — G8484 FLU IMMUNIZE NO ADMIN: HCPCS | Performed by: ORTHOPAEDIC SURGERY

## 2021-11-30 PROCEDURE — 99213 OFFICE O/P EST LOW 20 MIN: CPT | Performed by: ORTHOPAEDIC SURGERY

## 2021-11-30 PROCEDURE — G8399 PT W/DXA RESULTS DOCUMENT: HCPCS | Performed by: ORTHOPAEDIC SURGERY

## 2021-11-30 PROCEDURE — G8427 DOCREV CUR MEDS BY ELIG CLIN: HCPCS | Performed by: ORTHOPAEDIC SURGERY

## 2021-11-30 PROCEDURE — 1090F PRES/ABSN URINE INCON ASSESS: CPT | Performed by: ORTHOPAEDIC SURGERY

## 2021-11-30 PROCEDURE — 3017F COLORECTAL CA SCREEN DOC REV: CPT | Performed by: ORTHOPAEDIC SURGERY

## 2021-11-30 PROCEDURE — 1036F TOBACCO NON-USER: CPT | Performed by: ORTHOPAEDIC SURGERY

## 2021-11-30 RX ORDER — LIDOCAINE HYDROCHLORIDE 10 MG/ML
1 INJECTION, SOLUTION INFILTRATION; PERINEURAL ONCE
Status: COMPLETED | OUTPATIENT
Start: 2021-11-30 | End: 2021-11-30

## 2021-11-30 RX ORDER — METHYLPREDNISOLONE ACETATE 40 MG/ML
80 INJECTION, SUSPENSION INTRA-ARTICULAR; INTRALESIONAL; INTRAMUSCULAR; SOFT TISSUE ONCE
Status: COMPLETED | OUTPATIENT
Start: 2021-11-30 | End: 2021-11-30

## 2021-11-30 RX ADMIN — METHYLPREDNISOLONE ACETATE 80 MG: 40 INJECTION, SUSPENSION INTRA-ARTICULAR; INTRALESIONAL; INTRAMUSCULAR; SOFT TISSUE at 11:45

## 2021-11-30 RX ADMIN — LIDOCAINE HYDROCHLORIDE 1 ML: 10 INJECTION, SOLUTION INFILTRATION; PERINEURAL at 11:45

## 2021-11-30 NOTE — PROGRESS NOTES
Follow Up Evaluation of the Hip                                   11/30/2021    Zoë Lamb     1945       History of Present Illness:    Suma Chen is seen for Hip Pain (RT Hip, LOV 9/28/21)      Suma Chen returns accompanied by her  after 1 month having completed approximately 8 sessions of physical therapy. She states that physical therapy has not been helpful and in fact increases her pain for a day or so after the therapy. She continues to complain primarily of right lateral hip pain however has more recently developed some lumbar pain in addition. She continues to deny numbness in her lower extremity, weakness in her lower extremity, but does occasionally complain of giving way. She denies any specific history of injury. The pain started after she became active following a period of a few months on a kneeling walker after a right foot surgery. She denies any significant back deformity or injuries in the past.  She is currently having no groin pain. I have today reviewed with Zoë Lamb the clinically relevant past medical history, medications, allergies, family history, and Review of Systems from the patients most recent history form, and I have documented any details relevant to today's presenting complaints in my history above. The patient's self recorded documents concerning the above have been scanned  into the chart under the \"Media\" tab. Ht 5' 3.5\" (1.613 m)   Wt 135 lb (61.2 kg)   BMI 23.54 kg/m²     Physical examination  General Appearance: no acute distress, alert, oriented x 3, appropriate mood and affect  Limps when walking: no  Leg Length Discrepancy: yes - short on right 1/2\"  Well healed scar top of R Great toe  Palpitation Tenderness: yes -primarily at right greater trochanter and just below right greater trochanter.       Range of Motion of hip: in degrees   Flexion External  Rotation Internal Rotation Extension Adduction  Abduction   Right 100 40 20 0 15 40   Left 100 30 15 0 15 40       Motor Exam:                               Normal=N   Weak=W   Unable=U   Toe Walk Heel Walk Single Leg Squat   Right N N N   Left N N N     Strength Scale: 1-5   Extensor Hallucis Longus L5 Anterior Tibialis   L4 Quadraceps    L2,3,4   Right 5 5 5   Left 5 5 5     Reflex Exam: 0-4+   Achilles Tendon (gastroc)   S1 Patellar Tendon (quads)   L4   Right 2+ 2+   Left 2+ 1-2+     Sensory Exam:  Normal    Special Testing:     Log Roll Paul  [facility]SYD) [facility] Iliopsoas Stress Test   Right neg   neg  neg    Left neg   neg  neg      --------------------------------------------------------------------------------------------------------------------------------------------------------------------------------------------------------------  Limited diagnostic ultrasound was performed utilizing [email or web address] 18/4 linear transducer. The greater trochanter was evaluated in both long and short axis. This included the anterior facet middle and posterior facets. She did have an osteophyte noted corresponding to the plain x-ray findings performed at last office visit. There is no evidence of tendon avulsion or bursal effusion.  --------------------------------------------------------------------------------------------------------------------------------------------------------------------------------------------------------------    Impression:   1. Her pain appears to be related to enthesopathy of the gluteus medius tendon at its insertion into the right greater trochanter and is most marked at area of enthesophyte of the inferior portion of the greater trochanter. 2.  No evidence of pain of right hip joint origin on today's exam.  3.  No evidence of radiculopathy on today's physical examination. 4.  Leg length discrepancy with right lower extremity approximately 1/2 inch shorter than left. Plan/Treatment:   1.  Injection with cortisone was recommended into right greater trochanter at the gluteus medius insertion at the level of enthesophyte seen on ultrasound. . After discussing potential risks and benefits she agreed. The injection site was first visualized under ultrasound utilizing the 18/4 linear probe. The injection site was cleaned with alcohol, and the needle was guided using ultrasound using in plane technique. The site was then sterily injected with 1 cc  of 1% Lidocaine mixed with 2cc of 40 mg Depo Medrol. 2.  A shoe lift on the right was recommended for her leg length discrepancy. I recommended utilizing a 3/8 inch heel lift. 3.  She is restarted in physical therapy. I have recommended she continue therapy for the next 2 to 3 weeks. 4.  If there is no progress with her therapy I would recommend referral to a back surgeon for evaluation of lumbar spine to see if this is the source of her trochanteric pain. Monico Wong MD  11/30/2021    This dictation was done with [employer]zeda dictation and may contain mechanical errors related to translation.

## 2021-12-08 ENCOUNTER — TELEPHONE (OUTPATIENT)
Dept: ORTHOPEDIC SURGERY | Age: 76
End: 2021-12-08

## 2021-12-08 RX ORDER — METHYLPREDNISOLONE 4 MG/1
TABLET ORAL
Qty: 1 KIT | Refills: 0 | Status: SHIPPED | OUTPATIENT
Start: 2021-12-08 | End: 2021-12-14

## 2022-10-07 PROBLEM — M81.0 OSTEOPOROSIS: Status: ACTIVE | Noted: 2022-08-01

## 2023-07-11 ENCOUNTER — OFFICE VISIT (OUTPATIENT)
Dept: CARDIOLOGY CLINIC | Age: 78
End: 2023-07-11
Payer: MEDICARE

## 2023-07-11 VITALS
HEART RATE: 61 BPM | WEIGHT: 136 LBS | DIASTOLIC BLOOD PRESSURE: 86 MMHG | SYSTOLIC BLOOD PRESSURE: 120 MMHG | BODY MASS INDEX: 24.1 KG/M2 | HEIGHT: 63 IN

## 2023-07-11 DIAGNOSIS — R55 VASOVAGAL SYNCOPE: ICD-10-CM

## 2023-07-11 DIAGNOSIS — I10 ESSENTIAL HYPERTENSION: Primary | ICD-10-CM

## 2023-07-11 DIAGNOSIS — E78.5 HYPERLIPIDEMIA, UNSPECIFIED HYPERLIPIDEMIA TYPE: ICD-10-CM

## 2023-07-11 PROCEDURE — G8420 CALC BMI NORM PARAMETERS: HCPCS | Performed by: INTERNAL MEDICINE

## 2023-07-11 PROCEDURE — 1090F PRES/ABSN URINE INCON ASSESS: CPT | Performed by: INTERNAL MEDICINE

## 2023-07-11 PROCEDURE — G8399 PT W/DXA RESULTS DOCUMENT: HCPCS | Performed by: INTERNAL MEDICINE

## 2023-07-11 PROCEDURE — G8427 DOCREV CUR MEDS BY ELIG CLIN: HCPCS | Performed by: INTERNAL MEDICINE

## 2023-07-11 PROCEDURE — 99203 OFFICE O/P NEW LOW 30 MIN: CPT | Performed by: INTERNAL MEDICINE

## 2023-07-11 PROCEDURE — 1123F ACP DISCUSS/DSCN MKR DOCD: CPT | Performed by: INTERNAL MEDICINE

## 2023-07-11 PROCEDURE — 93000 ELECTROCARDIOGRAM COMPLETE: CPT | Performed by: INTERNAL MEDICINE

## 2023-07-11 PROCEDURE — 3079F DIAST BP 80-89 MM HG: CPT | Performed by: INTERNAL MEDICINE

## 2023-07-11 PROCEDURE — 1036F TOBACCO NON-USER: CPT | Performed by: INTERNAL MEDICINE

## 2023-07-11 PROCEDURE — 3074F SYST BP LT 130 MM HG: CPT | Performed by: INTERNAL MEDICINE

## 2023-07-11 RX ORDER — ALENDRONATE SODIUM 35 MG/1
35 TABLET ORAL
COMMUNITY
Start: 2022-09-02

## 2023-07-11 RX ORDER — CALCIUM CARBONATE 500 MG/1
1 TABLET, CHEWABLE ORAL DAILY
COMMUNITY

## 2023-07-11 NOTE — PATIENT INSTRUCTIONS
Recommend holding Maxide while traveling.  You can take your BP cuff with you and take a Maxide if needed for higher readings  Try to keep your regular routine while traveling: increase in hydration, improved sleep patterns, maintaining exercise routine, and minimize alcohol intake

## 2023-11-21 NOTE — PATIENT INSTRUCTIONS
Patient Education        Restless Legs Syndrome: Care Instructions  Your Care Instructions  Restless legs syndrome is a common nervous system problem. People with this syndrome feel a creeping, achy, or unpleasant feeling in the legs and an overpowering urge to move them. It often occurs in the evening and at night and can lead to sleep problems and tiredness. Your doctor may suggest doing a study of your sleep patterns to figure out what is happening when you try to sleep. Many people get relief from symptoms when they get regular exercise, eat well, and avoid caffeine, alcohol, and tobacco.  Follow-up care is a key part of your treatment and safety. Be sure to make and go to all appointments, and call your doctor if you are having problems. It's also a good idea to know your test results and keep a list of the medicines you take. How can you care for yourself at home? · Take your medicines exactly as prescribed. Call your doctor if you think you are having a problem with your medicine. · Try bathing in hot or cold water. Applying a heating pad or ice bag to your legs may also help symptoms. · Stretch and massage your legs before bed or when discomfort begins. · Get some exercise for at least 30 minutes a day on most days of the week. Stop exercising at least 3 hours before bedtime. · Try to plan for situations where you will need to remain seated for long stretches. For example, if you are traveling by car, plan some stops so you can get out and walk around. · Tell your doctor about any medicines you are taking. This includes all over-the-counter, prescription, and herbal medicines. Some medicines, such as antidepressants, antihistamines, and cold and sinus medicines, can make your symptoms worse. · Avoid caffeine products, such as coffee, tea, cola, and chocolate. Caffeine can interrupt your sleep and stimulate you. · Do not smoke. Nicotine can make restless legs worse.  If you need help quitting, talk Pt arrived to pacu from sx. vSS. Pt awake and arousable. to your doctor about stop-smoking programs and medicines. These can increase your chances of quitting for good. · Do not drink alcohol late in the evening. Take steps to help you sleep better  · Get plenty of sunlight in the outdoors, particularly later in the afternoon. · Use the evening hours for settling down. Avoid activities that challenge you in the hours before bedtime. · Eat meals at regular times, and do not snack before bedtime. · Keep your bedroom quiet, dark, and cool. Try using a sleep mask and earplugs to help you sleep. · Limit how much you drink at night to reduce your need to get up to urinate. But do not go to bed thirsty. · Run a fan or other steady \"white noise\" during the night if noises wake you up. · [place] the bed for sleeping and sex. Do your reading or TV watching in another room. · Once you are in bed, relax from head to toe, and guide your mind to pleasant thoughts. · Do not stay in bed longer than 8 hours, and try to avoid naps. When should you call for help? Watch closely for changes in your health, and be sure to contact your doctor if:    · You are still not getting enough sleep.     · Your symptoms become more severe or happen more often. Where can you learn more? Go to https://[employer]. org and sign in to your [email or web address] account. Enter U806 in the [facility] box to learn more about \"Restless Legs Syndrome: Care Instructions. \"     If you do not have an account, please click on the \"Sign Up Now\" link. Current as of: March 28, 2019  Content Version: 12.3  © 4805-1248 Healthwise, Incorporated. Care instructions adapted under license by [facility] ([facility]). If you have questions about a medical condition or this instruction, always ask your healthcare professional. [address] any warranty or liability for your use of this information.

## 2024-02-29 ENCOUNTER — HOSPITAL ENCOUNTER (OUTPATIENT)
Dept: GENERAL RADIOLOGY | Age: 79
Discharge: HOME | End: 2024-02-29
Attending: INTERNAL MEDICINE
Payer: MEDICARE

## 2024-02-29 ENCOUNTER — HOSPITAL ENCOUNTER (OUTPATIENT)
Age: 79
Discharge: HOME | End: 2024-02-29
Payer: MEDICARE

## 2024-02-29 DIAGNOSIS — M79.671 ACUTE FOOT PAIN, RIGHT: ICD-10-CM

## 2024-02-29 PROCEDURE — 73630 X-RAY EXAM OF FOOT: CPT

## 2024-09-03 ENCOUNTER — HOSPITAL ENCOUNTER (OUTPATIENT)
Dept: NEUROLOGY | Age: 79
Discharge: HOME | End: 2024-09-03
Payer: MEDICARE

## 2024-09-03 ENCOUNTER — HOSPITAL ENCOUNTER (OUTPATIENT)
Dept: VASCULAR LAB | Age: 79
Discharge: HOME | End: 2024-09-05
Payer: MEDICARE

## 2024-09-03 ENCOUNTER — HOSPITAL ENCOUNTER (OUTPATIENT)
Dept: MRI IMAGING | Age: 79
Discharge: HOME | End: 2024-09-03
Payer: MEDICARE

## 2024-09-03 DIAGNOSIS — G45.4 TRANSIENT GLOBAL AMNESIA: ICD-10-CM

## 2024-09-03 DIAGNOSIS — R40.20 LOSS OF CONSCIOUSNESS (HCC): ICD-10-CM

## 2024-09-03 LAB
VAS LEFT ARM BP: 138 MMHG
VAS LEFT CCA DIST EDV: 14.8 CM/S
VAS LEFT CCA DIST PSV: 54.3 CM/S
VAS LEFT CCA MID EDV: 15.9 CM/S
VAS LEFT CCA MID PSV: 67.5 CM/S
VAS LEFT CCA PROX EDV: 14.8 CM/S
VAS LEFT CCA PROX PSV: 68 CM/S
VAS LEFT ECA EDV: 15.4 CM/S
VAS LEFT ECA PSV: 104 CM/S
VAS LEFT ICA DIST EDV: 25.5 CM/S
VAS LEFT ICA DIST PSV: 85.7 CM/S
VAS LEFT ICA MID EDV: 24.9 CM/S
VAS LEFT ICA MID PSV: 75.8 CM/S
VAS LEFT ICA PROX EDV: 10.1 CM/S
VAS LEFT ICA PROX PSV: 35.9 CM/S
VAS LEFT ICA/CCA PSV: 0.53
VAS LEFT SUBCLAVIAN PROX EDV: 0 CM/S
VAS LEFT SUBCLAVIAN PROX PSV: 171 CM/S
VAS LEFT VERTEBRAL EDV: 11.9 CM/S
VAS LEFT VERTEBRAL PSV: 45.5 CM/S
VAS RIGHT ARM BP: 130 MMHG
VAS RIGHT CCA DIST EDV: 18 CM/S
VAS RIGHT CCA DIST PSV: 70.8 CM/S
VAS RIGHT CCA MID EDV: 21.7 CM/S
VAS RIGHT CCA MID PSV: 87 CM/S
VAS RIGHT CCA PROX EDV: 17.4 CM/S
VAS RIGHT CCA PROX PSV: 82 CM/S
VAS RIGHT ECA EDV: 12.4 CM/S
VAS RIGHT ECA PSV: 97.5 CM/S
VAS RIGHT ICA DIST EDV: 14.9 CM/S
VAS RIGHT ICA DIST PSV: 54.5 CM/S
VAS RIGHT ICA MID EDV: 16.9 CM/S
VAS RIGHT ICA MID PSV: 63.9 CM/S
VAS RIGHT ICA PROX EDV: 15.7 CM/S
VAS RIGHT ICA PROX PSV: 51 CM/S
VAS RIGHT ICA/CCA PSV: 0.59
VAS RIGHT SUBCLAVIAN PROX EDV: 0 CM/S
VAS RIGHT SUBCLAVIAN PROX PSV: 210 CM/S
VAS RIGHT VERTEBRAL EDV: 9.02 CM/S
VAS RIGHT VERTEBRAL PSV: 40.4 CM/S

## 2024-09-03 PROCEDURE — 95819 EEG AWAKE AND ASLEEP: CPT

## 2024-09-03 PROCEDURE — 93880 EXTRACRANIAL BILAT STUDY: CPT | Performed by: SURGERY

## 2024-09-03 PROCEDURE — 93880 EXTRACRANIAL BILAT STUDY: CPT

## 2024-09-03 PROCEDURE — 70551 MRI BRAIN STEM W/O DYE: CPT

## 2024-09-04 NOTE — PROCEDURES
PROCEDURE NOTE  Date: 2024   Name: Poonam Post  YOB: 1945    Name: Poonam Post   : 1945   Interpreting Physician: Deysi Murray MD   Referring Physician: Dieter Lee*   Date of EE2024      Clinical History: transient global amnesia    Technical Summary:  The EEG was recorded in a digital format on a patient who is reported to be awake and drowsy state during the recording. The patient was not sleep deprived prior to the EEG.    The recording revealed a normal background rhythm in the alpha frequency range that was maximal over the posterior head regions.   Intermittent motion artifact.     The record was remarkable for the absence of epileptiform discharges.    Photic stimulation was performed at various flash frequencies and without photoparoxysmal response.    Hyperventilation was not performed due to medical condition.     During the recording stage II sleep  was not seen.     The EKG lead revealed no rhythm abnormalties.    EEG Interpretation:   The EEG was normal in the awake and drowsy state.  No epileptiform activity.      No focal, lateralizing, or epileptiform features were seen during the recording.    Clinical correlation is recommended.  The absence of epileptiform discharges on a single EEG does not rule out a diagnosis of  epilepsy.    Electronically signed by Deysi Murray MD on 2024 at 9:03 AM

## 2024-10-28 NOTE — PROGRESS NOTES
[facility]   Electrophysiology Follow up     Date: 10/29/2024  I had the privilege of visiting Poonam Post in the office.       CC:  Passed out     HPI: Poonam Post is a 79 y.o. female with history of hypertension, hyperlipidemia, restless leg syndrome, syncope for which she was initially evaluated by electrophysiology in July 2023, cardiac workup has been largely negative, felt to be neurocardiogenic, most recently with loss of consciousness at home, found down by patient's , amnestic to events, brain MRI with no acute findings, referred to neurology (seen at [facility]) who felt this is most likely due to cardiac cause (on neurologic, no evidence of seizure), 30-day monitor with no sustained tiffani/tachy arrhythmia.     Patient presents to the office today as a new patient for evaluation and management of syncope.  Was advised by the approximately 4 syncopal episodes, all of them except for the most recent event involved being in a seated position, preceding meals where patient had sudden onset dizziness, lightheadedness and brief loss of consciousness, resolves without amnesia, no seizure-like activity.  No syncopal episodes as a child.  The most recent episode was different and that she was in her normal state of health, per nurse having pain and taking pain pill followed by small amount of alcohol intake, largely amnestic to the event and a large portion of time proceeding, was found down next to a tipped over chair. Has not had recurrence since. She does not have dizziness or lightheadedness with positional changes.     Review of System:  [x] Full ROS obtained and negative except as mentioned in HPI      Prior to Admission medications    Medication Sig Start Date End Date Taking? Authorizing Provider   aspirin 81 MG EC tablet Take 1 tablet by mouth daily   Yes Provider, MD Michael   docusate sodium (COLACE) 100 MG capsule Take by mouth daily   Yes Provider,

## 2024-10-29 ENCOUNTER — OFFICE VISIT (OUTPATIENT)
Dept: CARDIOLOGY CLINIC | Age: 79
End: 2024-10-29

## 2024-10-29 VITALS
DIASTOLIC BLOOD PRESSURE: 70 MMHG | OXYGEN SATURATION: 98 % | HEART RATE: 53 BPM | SYSTOLIC BLOOD PRESSURE: 112 MMHG | WEIGHT: 141 LBS | BODY MASS INDEX: 24.98 KG/M2

## 2024-10-29 DIAGNOSIS — R55 SYNCOPE, UNSPECIFIED SYNCOPE TYPE: Primary | ICD-10-CM

## 2024-10-29 RX ORDER — ASPIRIN 81 MG/1
81 TABLET ORAL DAILY
COMMUNITY

## 2024-10-29 RX ORDER — DOCUSATE SODIUM 100 MG/1
CAPSULE, LIQUID FILLED ORAL DAILY
COMMUNITY

## 2024-10-29 NOTE — PATIENT INSTRUCTIONS
Neurocardiogenic syncope, most likely cause of syncope, but may also be from another cause. In order to evaluate if the syncope is caused by a low heart rate or pause in heart beats, loop recorder is recommended to allow us to monitor your heart for up to 3 years for presence of abnormal heart rhythm which may have contributed to your syncopal episodes.    Syncope is medical term for passing out.     -------------------------------------------------------------------------------------------------------  If you have any question regarding your loop implant or would like to proceed with scheduling please contact Dr. Steve's Nurse Rakel at [phone].     Loop Recorder Implantation Pre Procedure Instructions     Date:____________________________     Arrive at:_________________________     Procedure time:_____________________        The morning of your procedure you will park in the [facility] parking lot and report directly to the cath lab to check in.  At the information desk stay right and go all the way to the end of the resendez, this will take you directly to your check in desk for the cath lab.       Pre-Procedure Instructions  Do not eat or drink anything for two hours prior to your procedure.  Do not use any lotions, creams or perfume the morning of procedure.  If you have a smart phone (apple or android)please bring with you the day of the procedure as well as your [employer] store password (IES or apple).  If you do not have a smart phone that is ok as you will be issued a base station which you will place at your bedside.   Cath lab will provide you with all post procedure instructions.  You will follow up one week after implantation for implantation site check to make sure it is healing well.  Do not shower or get the incision site wet for at least one week after the procedure. You will be scheduled for a one week post implant site check to make sure site is healing well and you we will let you know